# Patient Record
Sex: MALE | Race: OTHER | Employment: UNEMPLOYED | URBAN - METROPOLITAN AREA
[De-identification: names, ages, dates, MRNs, and addresses within clinical notes are randomized per-mention and may not be internally consistent; named-entity substitution may affect disease eponyms.]

---

## 2020-07-17 ENCOUNTER — ANESTHESIA EVENT (OUTPATIENT)
Dept: OPERATING ROOM | Age: 60
DRG: 857 | End: 2020-07-17

## 2020-07-17 ENCOUNTER — ANESTHESIA (OUTPATIENT)
Dept: OPERATING ROOM | Age: 60
DRG: 857 | End: 2020-07-17

## 2020-07-17 ENCOUNTER — HOSPITAL ENCOUNTER (INPATIENT)
Age: 60
LOS: 2 days | Discharge: HOME | DRG: 857 | End: 2020-07-19
Attending: EMERGENCY MEDICINE | Admitting: INTERNAL MEDICINE

## 2020-07-17 ENCOUNTER — APPOINTMENT (OUTPATIENT)
Dept: CT IMAGING | Age: 60
DRG: 857 | End: 2020-07-17

## 2020-07-17 VITALS
SYSTOLIC BLOOD PRESSURE: 156 MMHG | RESPIRATION RATE: 21 BRPM | TEMPERATURE: 98.1 F | OXYGEN SATURATION: 100 % | DIASTOLIC BLOOD PRESSURE: 96 MMHG

## 2020-07-17 PROBLEM — L02.211 ABSCESS OF ABDOMINAL WALL: Status: ACTIVE | Noted: 2020-07-17

## 2020-07-17 LAB
A/G RATIO: 1.1 (ref 1.1–2.2)
ALBUMIN SERPL-MCNC: 3.9 G/DL (ref 3.4–5)
ALP BLD-CCNC: 52 U/L (ref 40–129)
ALT SERPL-CCNC: 27 U/L (ref 10–40)
ANION GAP SERPL CALCULATED.3IONS-SCNC: 9 MMOL/L (ref 3–16)
AST SERPL-CCNC: 30 U/L (ref 15–37)
BASOPHILS ABSOLUTE: 0 K/UL (ref 0–0.2)
BASOPHILS RELATIVE PERCENT: 0.6 %
BILIRUB SERPL-MCNC: 0.8 MG/DL (ref 0–1)
BILIRUBIN URINE: NEGATIVE
BLOOD, URINE: NEGATIVE
BUN BLDV-MCNC: 8 MG/DL (ref 7–20)
CALCIUM SERPL-MCNC: 9.2 MG/DL (ref 8.3–10.6)
CHLORIDE BLD-SCNC: 100 MMOL/L (ref 99–110)
CLARITY: CLEAR
CO2: 25 MMOL/L (ref 21–32)
COLOR: ABNORMAL
CREAT SERPL-MCNC: 0.7 MG/DL (ref 0.9–1.3)
EOSINOPHILS ABSOLUTE: 0.1 K/UL (ref 0–0.6)
EOSINOPHILS RELATIVE PERCENT: 1.7 %
GFR AFRICAN AMERICAN: >60
GFR NON-AFRICAN AMERICAN: >60
GLOBULIN: 3.7 G/DL
GLUCOSE BLD-MCNC: 98 MG/DL (ref 70–99)
GLUCOSE URINE: NEGATIVE MG/DL
HCT VFR BLD CALC: 45.4 % (ref 40.5–52.5)
HEMOGLOBIN: 15.2 G/DL (ref 13.5–17.5)
INR BLD: 1.25 (ref 0.86–1.14)
KETONES, URINE: ABNORMAL MG/DL
LACTIC ACID, SEPSIS: 1.2 MMOL/L (ref 0.4–1.9)
LEUKOCYTE ESTERASE, URINE: NEGATIVE
LIPASE: 29 U/L (ref 13–60)
LYMPHOCYTES ABSOLUTE: 1.6 K/UL (ref 1–5.1)
LYMPHOCYTES RELATIVE PERCENT: 20.7 %
MCH RBC QN AUTO: 30 PG (ref 26–34)
MCHC RBC AUTO-ENTMCNC: 33.4 G/DL (ref 31–36)
MCV RBC AUTO: 89.9 FL (ref 80–100)
MICROSCOPIC EXAMINATION: ABNORMAL
MONOCYTES ABSOLUTE: 0.7 K/UL (ref 0–1.3)
MONOCYTES RELATIVE PERCENT: 9 %
NEUTROPHILS ABSOLUTE: 5.2 K/UL (ref 1.7–7.7)
NEUTROPHILS RELATIVE PERCENT: 68 %
NITRITE, URINE: NEGATIVE
PDW BLD-RTO: 12.8 % (ref 12.4–15.4)
PH UA: 5.5 (ref 5–8)
PLATELET # BLD: 249 K/UL (ref 135–450)
PMV BLD AUTO: 8.7 FL (ref 5–10.5)
POTASSIUM REFLEX MAGNESIUM: 4.3 MMOL/L (ref 3.5–5.1)
PROTEIN UA: NEGATIVE MG/DL
PROTHROMBIN TIME: 14.5 SEC (ref 10–13.2)
RBC # BLD: 5.04 M/UL (ref 4.2–5.9)
SODIUM BLD-SCNC: 134 MMOL/L (ref 136–145)
SPECIFIC GRAVITY UA: 1.02 (ref 1–1.03)
TOTAL PROTEIN: 7.6 G/DL (ref 6.4–8.2)
URINE REFLEX TO CULTURE: ABNORMAL
URINE TYPE: ABNORMAL
UROBILINOGEN, URINE: 1 E.U./DL
WBC # BLD: 7.6 K/UL (ref 4–11)

## 2020-07-17 PROCEDURE — 81003 URINALYSIS AUTO W/O SCOPE: CPT

## 2020-07-17 PROCEDURE — 85610 PROTHROMBIN TIME: CPT

## 2020-07-17 PROCEDURE — APPNB30 APP NON BILLABLE TIME 0-30 MINS: Performed by: NURSE PRACTITIONER

## 2020-07-17 PROCEDURE — 6360000002 HC RX W HCPCS: Performed by: INTERNAL MEDICINE

## 2020-07-17 PROCEDURE — 2500000003 HC RX 250 WO HCPCS: Performed by: NURSE ANESTHETIST, CERTIFIED REGISTERED

## 2020-07-17 PROCEDURE — 87077 CULTURE AEROBIC IDENTIFY: CPT

## 2020-07-17 PROCEDURE — 96367 TX/PROPH/DG ADDL SEQ IV INF: CPT

## 2020-07-17 PROCEDURE — 6360000002 HC RX W HCPCS: Performed by: SURGERY

## 2020-07-17 PROCEDURE — 83605 ASSAY OF LACTIC ACID: CPT

## 2020-07-17 PROCEDURE — 36415 COLL VENOUS BLD VENIPUNCTURE: CPT

## 2020-07-17 PROCEDURE — 2580000003 HC RX 258: Performed by: INTERNAL MEDICINE

## 2020-07-17 PROCEDURE — 96366 THER/PROPH/DIAG IV INF ADDON: CPT

## 2020-07-17 PROCEDURE — 3600000012 HC SURGERY LEVEL 2 ADDTL 15MIN: Performed by: SURGERY

## 2020-07-17 PROCEDURE — 2500000003 HC RX 250 WO HCPCS: Performed by: INTERNAL MEDICINE

## 2020-07-17 PROCEDURE — 3600000002 HC SURGERY LEVEL 2 BASE: Performed by: SURGERY

## 2020-07-17 PROCEDURE — 96365 THER/PROPH/DIAG IV INF INIT: CPT

## 2020-07-17 PROCEDURE — 6360000002 HC RX W HCPCS: Performed by: ANESTHESIOLOGY

## 2020-07-17 PROCEDURE — 87205 SMEAR GRAM STAIN: CPT

## 2020-07-17 PROCEDURE — 7100000000 HC PACU RECOVERY - FIRST 15 MIN: Performed by: SURGERY

## 2020-07-17 PROCEDURE — 85025 COMPLETE CBC W/AUTO DIFF WBC: CPT

## 2020-07-17 PROCEDURE — 2580000003 HC RX 258: Performed by: SURGERY

## 2020-07-17 PROCEDURE — 0KBK0ZZ EXCISION OF RIGHT ABDOMEN MUSCLE, OPEN APPROACH: ICD-10-PCS | Performed by: SURGERY

## 2020-07-17 PROCEDURE — 99254 IP/OBS CNSLTJ NEW/EST MOD 60: CPT | Performed by: SURGERY

## 2020-07-17 PROCEDURE — 6360000002 HC RX W HCPCS: Performed by: PHYSICIAN ASSISTANT

## 2020-07-17 PROCEDURE — 2580000003 HC RX 258: Performed by: NURSE ANESTHETIST, CERTIFIED REGISTERED

## 2020-07-17 PROCEDURE — 1200000000 HC SEMI PRIVATE

## 2020-07-17 PROCEDURE — 11046 DBRDMT MUSC&/FSCA EA ADDL: CPT | Performed by: SURGERY

## 2020-07-17 PROCEDURE — 7100000001 HC PACU RECOVERY - ADDTL 15 MIN: Performed by: SURGERY

## 2020-07-17 PROCEDURE — 6360000002 HC RX W HCPCS: Performed by: NURSE ANESTHETIST, CERTIFIED REGISTERED

## 2020-07-17 PROCEDURE — 3700000001 HC ADD 15 MINUTES (ANESTHESIA): Performed by: SURGERY

## 2020-07-17 PROCEDURE — 87102 FUNGUS ISOLATION CULTURE: CPT

## 2020-07-17 PROCEDURE — 96375 TX/PRO/DX INJ NEW DRUG ADDON: CPT

## 2020-07-17 PROCEDURE — 74177 CT ABD & PELVIS W/CONTRAST: CPT

## 2020-07-17 PROCEDURE — 99285 EMERGENCY DEPT VISIT HI MDM: CPT

## 2020-07-17 PROCEDURE — 83690 ASSAY OF LIPASE: CPT

## 2020-07-17 PROCEDURE — 6360000004 HC RX CONTRAST MEDICATION: Performed by: EMERGENCY MEDICINE

## 2020-07-17 PROCEDURE — 11043 DBRDMT MUSC&/FSCA 1ST 20/<: CPT | Performed by: SURGERY

## 2020-07-17 PROCEDURE — 87186 SC STD MICRODIL/AGAR DIL: CPT

## 2020-07-17 PROCEDURE — 87040 BLOOD CULTURE FOR BACTERIA: CPT

## 2020-07-17 PROCEDURE — 3700000000 HC ANESTHESIA ATTENDED CARE: Performed by: SURGERY

## 2020-07-17 PROCEDURE — 87075 CULTR BACTERIA EXCEPT BLOOD: CPT

## 2020-07-17 PROCEDURE — 2709999900 HC NON-CHARGEABLE SUPPLY: Performed by: SURGERY

## 2020-07-17 PROCEDURE — 87070 CULTURE OTHR SPECIMN AEROBIC: CPT

## 2020-07-17 PROCEDURE — 2580000003 HC RX 258: Performed by: PHYSICIAN ASSISTANT

## 2020-07-17 PROCEDURE — 80053 COMPREHEN METABOLIC PANEL: CPT

## 2020-07-17 RX ORDER — 0.9 % SODIUM CHLORIDE 0.9 %
1000 INTRAVENOUS SOLUTION INTRAVENOUS ONCE
Status: COMPLETED | OUTPATIENT
Start: 2020-07-17 | End: 2020-07-17

## 2020-07-17 RX ORDER — ONDANSETRON 2 MG/ML
4 INJECTION INTRAMUSCULAR; INTRAVENOUS
Status: DISCONTINUED | OUTPATIENT
Start: 2020-07-17 | End: 2020-07-17

## 2020-07-17 RX ORDER — ACETAMINOPHEN 650 MG/1
650 SUPPOSITORY RECTAL EVERY 6 HOURS PRN
Status: DISCONTINUED | OUTPATIENT
Start: 2020-07-17 | End: 2020-07-19 | Stop reason: HOSPADM

## 2020-07-17 RX ORDER — LABETALOL HYDROCHLORIDE 5 MG/ML
5 INJECTION, SOLUTION INTRAVENOUS EVERY 10 MIN PRN
Status: DISCONTINUED | OUTPATIENT
Start: 2020-07-17 | End: 2020-07-17

## 2020-07-17 RX ORDER — LIDOCAINE HYDROCHLORIDE 10 MG/ML
1 INJECTION, SOLUTION EPIDURAL; INFILTRATION; INTRACAUDAL; PERINEURAL
Status: DISCONTINUED | OUTPATIENT
Start: 2020-07-17 | End: 2020-07-17 | Stop reason: HOSPADM

## 2020-07-17 RX ORDER — SODIUM CHLORIDE 9 MG/ML
INJECTION, SOLUTION INTRAVENOUS CONTINUOUS
Status: DISCONTINUED | OUTPATIENT
Start: 2020-07-17 | End: 2020-07-18

## 2020-07-17 RX ORDER — SODIUM CHLORIDE 0.9 % (FLUSH) 0.9 %
10 SYRINGE (ML) INJECTION EVERY 12 HOURS SCHEDULED
Status: DISCONTINUED | OUTPATIENT
Start: 2020-07-17 | End: 2020-07-17 | Stop reason: HOSPADM

## 2020-07-17 RX ORDER — SODIUM CHLORIDE 0.9 % (FLUSH) 0.9 %
10 SYRINGE (ML) INJECTION PRN
Status: DISCONTINUED | OUTPATIENT
Start: 2020-07-17 | End: 2020-07-17 | Stop reason: HOSPADM

## 2020-07-17 RX ORDER — FENTANYL CITRATE 50 UG/ML
INJECTION, SOLUTION INTRAMUSCULAR; INTRAVENOUS PRN
Status: DISCONTINUED | OUTPATIENT
Start: 2020-07-17 | End: 2020-07-17 | Stop reason: SDUPTHER

## 2020-07-17 RX ORDER — ONDANSETRON 2 MG/ML
4 INJECTION INTRAMUSCULAR; INTRAVENOUS ONCE
Status: COMPLETED | OUTPATIENT
Start: 2020-07-17 | End: 2020-07-17

## 2020-07-17 RX ORDER — ROCURONIUM BROMIDE 10 MG/ML
INJECTION, SOLUTION INTRAVENOUS PRN
Status: DISCONTINUED | OUTPATIENT
Start: 2020-07-17 | End: 2020-07-17 | Stop reason: SDUPTHER

## 2020-07-17 RX ORDER — PROPOFOL 10 MG/ML
INJECTION, EMULSION INTRAVENOUS PRN
Status: DISCONTINUED | OUTPATIENT
Start: 2020-07-17 | End: 2020-07-17 | Stop reason: SDUPTHER

## 2020-07-17 RX ORDER — FENTANYL CITRATE 50 UG/ML
25 INJECTION, SOLUTION INTRAMUSCULAR; INTRAVENOUS EVERY 5 MIN PRN
Status: DISCONTINUED | OUTPATIENT
Start: 2020-07-17 | End: 2020-07-17

## 2020-07-17 RX ORDER — HYDRALAZINE HYDROCHLORIDE 20 MG/ML
5 INJECTION INTRAMUSCULAR; INTRAVENOUS EVERY 10 MIN PRN
Status: DISCONTINUED | OUTPATIENT
Start: 2020-07-17 | End: 2020-07-17

## 2020-07-17 RX ORDER — SODIUM CHLORIDE 0.9 % (FLUSH) 0.9 %
10 SYRINGE (ML) INJECTION EVERY 12 HOURS SCHEDULED
Status: DISCONTINUED | OUTPATIENT
Start: 2020-07-17 | End: 2020-07-19 | Stop reason: HOSPADM

## 2020-07-17 RX ORDER — ACETAMINOPHEN 325 MG/1
650 TABLET ORAL EVERY 6 HOURS PRN
Status: DISCONTINUED | OUTPATIENT
Start: 2020-07-17 | End: 2020-07-19 | Stop reason: HOSPADM

## 2020-07-17 RX ORDER — ACETAMINOPHEN 500 MG
1000 TABLET ORAL EVERY 6 HOURS PRN
Status: DISCONTINUED | OUTPATIENT
Start: 2020-07-17 | End: 2020-07-17 | Stop reason: HOSPADM

## 2020-07-17 RX ORDER — SODIUM CHLORIDE, SODIUM LACTATE, POTASSIUM CHLORIDE, CALCIUM CHLORIDE 600; 310; 30; 20 MG/100ML; MG/100ML; MG/100ML; MG/100ML
INJECTION, SOLUTION INTRAVENOUS CONTINUOUS
Status: DISCONTINUED | OUTPATIENT
Start: 2020-07-17 | End: 2020-07-18

## 2020-07-17 RX ORDER — SODIUM CHLORIDE 0.9 % (FLUSH) 0.9 %
10 SYRINGE (ML) INJECTION PRN
Status: DISCONTINUED | OUTPATIENT
Start: 2020-07-17 | End: 2020-07-19 | Stop reason: HOSPADM

## 2020-07-17 RX ORDER — ONDANSETRON 2 MG/ML
INJECTION INTRAMUSCULAR; INTRAVENOUS PRN
Status: DISCONTINUED | OUTPATIENT
Start: 2020-07-17 | End: 2020-07-17 | Stop reason: SDUPTHER

## 2020-07-17 RX ORDER — HYDROCODONE BITARTRATE AND ACETAMINOPHEN 5; 325 MG/1; MG/1
1 TABLET ORAL
Status: DISCONTINUED | OUTPATIENT
Start: 2020-07-17 | End: 2020-07-17

## 2020-07-17 RX ORDER — OXYCODONE HYDROCHLORIDE AND ACETAMINOPHEN 5; 325 MG/1; MG/1
1 TABLET ORAL EVERY 6 HOURS PRN
Status: DISCONTINUED | OUTPATIENT
Start: 2020-07-17 | End: 2020-07-19 | Stop reason: HOSPADM

## 2020-07-17 RX ORDER — MORPHINE SULFATE 2 MG/ML
4 INJECTION, SOLUTION INTRAMUSCULAR; INTRAVENOUS
Status: DISCONTINUED | OUTPATIENT
Start: 2020-07-17 | End: 2020-07-18

## 2020-07-17 RX ORDER — SODIUM CHLORIDE 9 MG/ML
INJECTION, SOLUTION INTRAVENOUS CONTINUOUS PRN
Status: DISCONTINUED | OUTPATIENT
Start: 2020-07-17 | End: 2020-07-17 | Stop reason: SDUPTHER

## 2020-07-17 RX ORDER — SUCCINYLCHOLINE/SOD CL,ISO/PF 200MG/10ML
SYRINGE (ML) INTRAVENOUS PRN
Status: DISCONTINUED | OUTPATIENT
Start: 2020-07-17 | End: 2020-07-17 | Stop reason: SDUPTHER

## 2020-07-17 RX ORDER — MORPHINE SULFATE 4 MG/ML
4 INJECTION, SOLUTION INTRAMUSCULAR; INTRAVENOUS ONCE
Status: COMPLETED | OUTPATIENT
Start: 2020-07-17 | End: 2020-07-17

## 2020-07-17 RX ORDER — DEXAMETHASONE SODIUM PHOSPHATE 4 MG/ML
INJECTION, SOLUTION INTRA-ARTICULAR; INTRALESIONAL; INTRAMUSCULAR; INTRAVENOUS; SOFT TISSUE PRN
Status: DISCONTINUED | OUTPATIENT
Start: 2020-07-17 | End: 2020-07-17 | Stop reason: SDUPTHER

## 2020-07-17 RX ORDER — MORPHINE SULFATE 2 MG/ML
2 INJECTION, SOLUTION INTRAMUSCULAR; INTRAVENOUS
Status: DISCONTINUED | OUTPATIENT
Start: 2020-07-17 | End: 2020-07-18

## 2020-07-17 RX ORDER — MAGNESIUM HYDROXIDE 1200 MG/15ML
LIQUID ORAL CONTINUOUS PRN
Status: COMPLETED | OUTPATIENT
Start: 2020-07-17 | End: 2020-07-17

## 2020-07-17 RX ORDER — LIDOCAINE HYDROCHLORIDE 20 MG/ML
INJECTION, SOLUTION EPIDURAL; INFILTRATION; INTRACAUDAL; PERINEURAL PRN
Status: DISCONTINUED | OUTPATIENT
Start: 2020-07-17 | End: 2020-07-17 | Stop reason: SDUPTHER

## 2020-07-17 RX ORDER — HYDROMORPHONE HCL 110MG/55ML
0.5 PATIENT CONTROLLED ANALGESIA SYRINGE INTRAVENOUS EVERY 5 MIN PRN
Status: DISCONTINUED | OUTPATIENT
Start: 2020-07-17 | End: 2020-07-17

## 2020-07-17 RX ORDER — PROCHLORPERAZINE EDISYLATE 5 MG/ML
5 INJECTION INTRAMUSCULAR; INTRAVENOUS
Status: DISCONTINUED | OUTPATIENT
Start: 2020-07-17 | End: 2020-07-17

## 2020-07-17 RX ORDER — ACETAMINOPHEN 650 MG
TABLET, EXTENDED RELEASE ORAL
Status: COMPLETED | OUTPATIENT
Start: 2020-07-17 | End: 2020-07-17

## 2020-07-17 RX ADMIN — VANCOMYCIN HYDROCHLORIDE 1750 MG: 10 INJECTION, POWDER, LYOPHILIZED, FOR SOLUTION INTRAVENOUS at 21:12

## 2020-07-17 RX ADMIN — MORPHINE SULFATE 4 MG: 4 INJECTION INTRAVENOUS at 08:38

## 2020-07-17 RX ADMIN — CEFEPIME HYDROCHLORIDE 2 G: 2 INJECTION, POWDER, FOR SOLUTION INTRAVENOUS at 15:35

## 2020-07-17 RX ADMIN — SUGAMMADEX 200 MG: 100 INJECTION, SOLUTION INTRAVENOUS at 17:32

## 2020-07-17 RX ADMIN — FENTANYL CITRATE 50 MCG: 50 INJECTION, SOLUTION INTRAMUSCULAR; INTRAVENOUS at 16:52

## 2020-07-17 RX ADMIN — METRONIDAZOLE 500 MG: 500 INJECTION, SOLUTION INTRAVENOUS at 15:13

## 2020-07-17 RX ADMIN — ONDANSETRON 4 MG: 2 INJECTION INTRAMUSCULAR; INTRAVENOUS at 17:09

## 2020-07-17 RX ADMIN — CEFEPIME 2 G: 2 INJECTION, POWDER, FOR SOLUTION INTRAVENOUS at 08:37

## 2020-07-17 RX ADMIN — HYDROMORPHONE HYDROCHLORIDE 0.5 MG: 2 INJECTION, SOLUTION INTRAMUSCULAR; INTRAVENOUS; SUBCUTANEOUS at 18:03

## 2020-07-17 RX ADMIN — MORPHINE SULFATE 4 MG: 2 INJECTION, SOLUTION INTRAMUSCULAR; INTRAVENOUS at 19:25

## 2020-07-17 RX ADMIN — IOPAMIDOL 75 ML: 755 INJECTION, SOLUTION INTRAVENOUS at 09:43

## 2020-07-17 RX ADMIN — HYDROMORPHONE HYDROCHLORIDE 0.5 MG: 2 INJECTION, SOLUTION INTRAMUSCULAR; INTRAVENOUS; SUBCUTANEOUS at 17:53

## 2020-07-17 RX ADMIN — VANCOMYCIN HYDROCHLORIDE 1500 MG: 10 INJECTION, POWDER, LYOPHILIZED, FOR SOLUTION INTRAVENOUS at 09:23

## 2020-07-17 RX ADMIN — FENTANYL CITRATE 50 MCG: 50 INJECTION, SOLUTION INTRAMUSCULAR; INTRAVENOUS at 17:16

## 2020-07-17 RX ADMIN — SODIUM CHLORIDE: 9 INJECTION, SOLUTION INTRAVENOUS at 16:45

## 2020-07-17 RX ADMIN — DEXAMETHASONE SODIUM PHOSPHATE 8 MG: 4 INJECTION, SOLUTION INTRAMUSCULAR; INTRAVENOUS at 17:09

## 2020-07-17 RX ADMIN — LIDOCAINE HYDROCHLORIDE 80 MG: 20 INJECTION, SOLUTION EPIDURAL; INFILTRATION; INTRACAUDAL; PERINEURAL at 16:58

## 2020-07-17 RX ADMIN — SODIUM CHLORIDE 1000 ML: 9 INJECTION, SOLUTION INTRAVENOUS at 08:37

## 2020-07-17 RX ADMIN — ONDANSETRON 4 MG: 2 INJECTION INTRAMUSCULAR; INTRAVENOUS at 08:38

## 2020-07-17 RX ADMIN — ROCURONIUM BROMIDE 25 MG: 10 INJECTION, SOLUTION INTRAVENOUS at 17:17

## 2020-07-17 RX ADMIN — Medication 120 MG: at 16:58

## 2020-07-17 RX ADMIN — PROPOFOL 150 MG: 10 INJECTION, EMULSION INTRAVENOUS at 16:58

## 2020-07-17 RX ADMIN — SODIUM CHLORIDE: 9 INJECTION, SOLUTION INTRAVENOUS at 16:57

## 2020-07-17 ASSESSMENT — PULMONARY FUNCTION TESTS
PIF_VALUE: 23
PIF_VALUE: 17
PIF_VALUE: 20
PIF_VALUE: 3
PIF_VALUE: 20
PIF_VALUE: 0
PIF_VALUE: 41
PIF_VALUE: 18
PIF_VALUE: 20
PIF_VALUE: 20
PIF_VALUE: 18
PIF_VALUE: 23
PIF_VALUE: 21
PIF_VALUE: 20
PIF_VALUE: 17
PIF_VALUE: 20
PIF_VALUE: 18
PIF_VALUE: 20
PIF_VALUE: 21
PIF_VALUE: 18
PIF_VALUE: 2
PIF_VALUE: 3
PIF_VALUE: 20
PIF_VALUE: 1
PIF_VALUE: 24
PIF_VALUE: 31
PIF_VALUE: 19
PIF_VALUE: 21
PIF_VALUE: 20
PIF_VALUE: 19
PIF_VALUE: 44
PIF_VALUE: 1
PIF_VALUE: 1
PIF_VALUE: 20
PIF_VALUE: 27
PIF_VALUE: 18
PIF_VALUE: 1
PIF_VALUE: 17
PIF_VALUE: 17
PIF_VALUE: 21
PIF_VALUE: 20
PIF_VALUE: 18
PIF_VALUE: 20
PIF_VALUE: 20
PIF_VALUE: 1
PIF_VALUE: 21
PIF_VALUE: 1
PIF_VALUE: 20
PIF_VALUE: 22

## 2020-07-17 ASSESSMENT — PAIN SCALES - GENERAL
PAINLEVEL_OUTOF10: 5
PAINLEVEL_OUTOF10: 9
PAINLEVEL_OUTOF10: 7
PAINLEVEL_OUTOF10: 9
PAINLEVEL_OUTOF10: 0
PAINLEVEL_OUTOF10: 0
PAINLEVEL_OUTOF10: 3
PAINLEVEL_OUTOF10: 6

## 2020-07-17 ASSESSMENT — ENCOUNTER SYMPTOMS
COUGH: 0
ABDOMINAL PAIN: 1
COLOR CHANGE: 0
VOMITING: 0
ABDOMINAL DISTENTION: 0
BACK PAIN: 0
CONSTIPATION: 0
CHEST TIGHTNESS: 0
SHORTNESS OF BREATH: 0
RESPIRATORY NEGATIVE: 1
DIARRHEA: 1
SHORTNESS OF BREATH: 0
NAUSEA: 1

## 2020-07-17 ASSESSMENT — PAIN DESCRIPTION - LOCATION
LOCATION: ABDOMEN

## 2020-07-17 ASSESSMENT — PAIN DESCRIPTION - PAIN TYPE
TYPE: SURGICAL PAIN
TYPE: ACUTE PAIN

## 2020-07-17 ASSESSMENT — PAIN - FUNCTIONAL ASSESSMENT: PAIN_FUNCTIONAL_ASSESSMENT: 0-10

## 2020-07-17 NOTE — BRIEF OP NOTE
Brief Postoperative Note      Patient: Pete Haynes  YOB: 1960  MRN: [id number]    Date of Procedure: 7/17/2020    Pre-Op Diagnosis: Abdominal Wall Abscess    Post-Op Diagnosis: Same       Procedure(s):  INCISION AND DRAINAGE  / Debridement OF ABDOMINAL WALL ABSCESS    Surgeon(s):  Bakari Richards MD    Assistant:  First Assistant: Dwayne Ceron, RN; Malcom Jarvis RN    Anesthesia: General    Estimated Blood Loss (mL): Minimal    Complications: None    Specimens:   ID Type Source Tests Collected by Time Destination   1 : 1) ABDOMINAL ABSCESS CULTURE Tissue Tissue CULTURE, TISSUE Bakari Richards MD 7/17/2020 1717        Implants:  * No implants in log *      Drains: * No LDAs found *    Findings: Large deep necrotizing abscess; Skin, SQ, and muscle    Electronically signed by Bakari Richards MD on 7/17/2020 at 5:38 PM

## 2020-07-17 NOTE — PROGRESS NOTES
Pt arrived from OR to PACU, awakens to voice. VSS, O2 sats 93% on room air. Dressing on abdomen dry and intact, ice pack placed. Will monitor.

## 2020-07-17 NOTE — CONSULTS
injection 5 mg, 5 mg, Intravenous, Q10 Min PRN  Prior to Admission medications    Not on File        Allergies:  Patient has no known allergies. Social History:    reports that he has never smoked. He has never used smokeless tobacco. He reports that he does not drink alcohol or use drugs. Family History:    History reviewed. No pertinent family history. REVIEW OF SYSTEMS:  CONSTITUTIONAL:  negative  HEENT:  negative  RESPIRATORY:  negative  CARDIOVASCULAR:  negative  GASTROINTESTINAL:  negative except for change in bowel habits, abdominal pain and abdominal mass  GENITOURINARY:  negative  HEMATOLOGIC/LYMPHATIC:  negative  NEUROLOGICAL:  negative  SKIN: negative    PHYSICAL EXAM:  VITALS:  /80   Pulse 69   Temp 98.6 °F (37 °C) (Temporal)   Resp 16   Ht 5' 5\" (1.651 m)   Wt 213 lb (96.6 kg)   SpO2 98%   BMI 35.45 kg/m²   24HR INTAKE/OUTPUT:      Intake/Output Summary (Last 24 hours) at 7/17/2020 1640  Last data filed at 7/17/2020 1135  Gross per 24 hour   Intake 1050 ml   Output --   Net 1050 ml     DRAIN/TUBE OUTPUT:     CONSTITUTIONAL:  alert, no apparent distress and moderately obese  EYES:  sclera clear  ENT:  normocepalic, without obvious abnormality  NECK:  supple, symmetrical, trachea midline  LUNGS:  clear to auscultation  CARDIOVASCULAR:  regular rate and rhythm  ABDOMEN:  scars noted large midline scar and lower abd scar, normal bowel sounds, soft, non-distended, tenderness noted in the lower abdomen, voluntary guarding absent, no masses palpated, no hepatosplenomegally and hernia absent  MUSCULOSKELETAL:  0+ pitting edema lower extremities  NEUROLOGIC:  Mental Status Exam:  Level of Alertness:   awake  Orientation:   person, place, time  SKIN: low abd incision / old scar region with redness, warmth, and swelling.  Has denuded skin with drainage    DATA:    CBC:   Recent Labs     07/17/20  0830   WBC 7.6   HGB 15.2   HCT 45.4        BMP:    Recent Labs     07/17/20  0830   NA 134*   K 4.3      CO2 25   BUN 8   CREATININE 0.7*   GLUCOSE 98     Hepatic:   Recent Labs     07/17/20  0830   AST 30   ALT 27   BILITOT 0.8   ALKPHOS 52     Mag:    No results for input(s): MG in the last 72 hours. Phos:   No results for input(s): PHOS in the last 72 hours. INR:   Recent Labs     07/17/20  0830   INR 1.25*     LIPASE:   Recent Labs     07/17/20  0830   LIPASE 29.0      AMYLASE: No results for input(s): AMYLASE in the last 72 hours. Radiology Review:       Ct Abdomen Pelvis W Iv Contrast Additional Contrast? None    Result Date: 7/17/2020  EXAMINATION: CT OF THE ABDOMEN AND PELVIS WITH CONTRAST 7/17/2020 9:43 am TECHNIQUE: CT of the abdomen and pelvis was performed with the administration of intravenous contrast. Multiplanar reformatted images are provided for review. Dose modulation, iterative reconstruction, and/or weight based adjustment of the mA/kV was utilized to reduce the radiation dose to as low as reasonably achievable. COMPARISON: None. HISTORY: ORDERING SYSTEM PROVIDED HISTORY: abscess - abd - midline - prev surgical scar TECHNOLOGIST PROVIDED HISTORY: Additional Contrast?->None Reason for exam:->abscess - abd - midline - prev surgical scar Reason for Exam: abscess - abd - midline - prev surgical scar Acuity: Unknown Type of Exam: Unknown FINDINGS: Lower Chest: There is no consolidation or effusion. Organs: The liver exhibits mild diffuse fatty infiltration. The remainder of the solid abdominal organs are unremarkable aside from a small benign 3.3 cm simple left renal cyst. GI/Bowel: There is no bowel dilatation, wall thickening or obstruction. Pelvis: There are small fat and colon containing bilateral indirect inguinal hernias without complicating feature. The bladder and prostate are unremarkable. Peritoneum/Retroperitoneum: There is no free air, free fluid or intraperitoneal inflammatory change. There is no adenopathy. Bones/Soft Tissues:  There is a thick walled rim

## 2020-07-17 NOTE — ANESTHESIA PRE PROCEDURE
Department of Anesthesiology  Preprocedure Note       Name:  Pete Haynes   Age:  62 y.o.  :  1960                                          MRN:  [id number]         Date:  2020      Surgeon: Yasmany Macdonald):  Bakari Richards MD    Procedure: Procedure(s):  INCISION AND DRAINAGE OF ABDOMINAL WALL ABSCESS    Medications prior to admission:   Prior to Admission medications    Not on File       Current medications:    No current facility-administered medications for this encounter. Allergies:  No Known Allergies    Problem List:    Patient Active Problem List   Diagnosis Code    Abdominal wall abscess L02.211       Past Medical History:  History reviewed. No pertinent past medical history. Past Surgical History:        Procedure Laterality Date    OTHER SURGICAL HISTORY      ORIF RIGHT FOREARM       Social History:    Social History     Tobacco Use    Smoking status: Never Smoker    Smokeless tobacco: Never Used   Substance Use Topics    Alcohol use: No                                Counseling given: Not Answered      Vital Signs (Current):   Vitals:    20 1200 20 1230 20 1320 20 1358   BP: 122/77 116/72 114/74 124/80   Pulse:   65 69   Resp:   18 16   Temp:    98.6 °F (37 °C)   TempSrc:    Temporal   SpO2: 97%  96% 98%   Weight:    213 lb (96.6 kg)   Height:    5' 5\" (1.651 m)                                              BP Readings from Last 3 Encounters:   20 124/80       NPO Status:                                                                                 BMI:   Wt Readings from Last 3 Encounters:   20 213 lb (96.6 kg)     Body mass index is 35.45 kg/m².     CBC:   Lab Results   Component Value Date    WBC 7.6 2020    RBC 5.04 2020    HGB 15.2 2020    HCT 45.4 2020    MCV 89.9 2020    RDW 12.8 2020     2020       CMP:   Lab Results   Component Value Date     2020    K 4.3 2020  07/17/2020    CO2 25 07/17/2020    BUN 8 07/17/2020    CREATININE 0.7 07/17/2020    GFRAA >60 07/17/2020    AGRATIO 1.1 07/17/2020    LABGLOM >60 07/17/2020    GLUCOSE 98 07/17/2020    PROT 7.6 07/17/2020    CALCIUM 9.2 07/17/2020    BILITOT 0.8 07/17/2020    ALKPHOS 52 07/17/2020    AST 30 07/17/2020    ALT 27 07/17/2020       POC Tests: No results for input(s): POCGLU, POCNA, POCK, POCCL, POCBUN, POCHEMO, POCHCT in the last 72 hours. Coags:   Lab Results   Component Value Date    PROTIME 14.5 07/17/2020    INR 1.25 07/17/2020       HCG (If Applicable): No results found for: PREGTESTUR, PREGSERUM, HCG, HCGQUANT     ABGs: No results found for: PHART, PO2ART, MWE0NGX, LZV6SMI, BEART, B6OHRMOB     Type & Screen (If Applicable):  No results found for: LABABO, LABRH    Drug/Infectious Status (If Applicable):  No results found for: HIV, HEPCAB    COVID-19 Screening (If Applicable): No results found for: COVID19      Anesthesia Evaluation  Patient summary reviewed and Nursing notes reviewed no history of anesthetic complications:   Airway: Mallampati: II  TM distance: >3 FB   Neck ROM: full  Mouth opening: > = 3 FB Dental: normal exam   (+) upper dentures and lower dentures      Pulmonary:       (-) asthma and shortness of breath                           Cardiovascular:  Exercise tolerance: good (>4 METS),       (-) hypertension and  angina                Neuro/Psych:      (-) CVA           GI/Hepatic/Renal:        (-) GERD and liver disease       Endo/Other:        (-) diabetes mellitus, hypothyroidism               Abdominal:           Vascular:     - PVD. Anesthesia Plan      general     ASA 2       Induction: intravenous. MIPS: Postoperative opioids intended and Prophylactic antiemetics administered. Anesthetic plan and risks discussed with patient. Use of blood products discussed with patient whom. Plan discussed with CRNA.                   Lonnie Oliver Leny Johnson MD   7/17/2020

## 2020-07-17 NOTE — H&P
input(s): Theldalbin Rough in the last 72 hours. Urinalysis:      Lab Results   Component Value Date    NITRU Negative 07/17/2020    BLOODU Negative 07/17/2020    SPECGRAV 1.022 07/17/2020    GLUCOSEU Negative 07/17/2020       Radiology:     CXR: I have reviewed the CXR with the following interpretation: Not available  EKG:  I have reviewed the EKG with the following interpretation: Not available    CT ABDOMEN PELVIS W IV CONTRAST Additional Contrast? None   Final Result   Large 6.5 cm right ventral pelvic subcutaneous abscess. ASSESSMENT:    Active Hospital Problems    Diagnosis Date Noted    Abscess of abdominal wall [L02.211] 07/17/2020         PLAN:    Subcutaneous abdominal wall abscess  Surgery consulted for I&D  Initiated on broad-spectrum antibiotics  Cultures ordered  We will narrow antibiotic coverage based on cultures      DVT Prophylaxis: Lovenox  Diet: Diet NPO Effective Now Exceptions are:  Other (See Comments)  Code Status: No Order    Electronically signed by Jennie Simon MD on 7/17/2020 at 12:32 PM

## 2020-07-17 NOTE — ED PROVIDER NOTES
I independently performed a history and physical on [place]. All diagnostic, treatment, and disposition decisions were made by myself in conjunction with the advanced practice provider. I have participated in the medical decision making and directed the treatment plan and disposition of the patient. For further details of [facility] emergency department encounter, please see the advanced practice provider's documentation. CHIEF COMPLAINT  Chief Complaint   Patient presents with    Abdominal Pain     Pt to Er with c/o RLQ pain that is moving into left side for two weeeks, states diarrhea since but none today. nausea, no vomiting. \"swelling inside\"     Briefly, GARRICK CONRAD is a 62 y.o. male  who presents to the ED complaining of lower abdominal pain with open wound and purulent drainage. Some nausea. No vomiting. No issues with urination. History of cholecystectomy and ex-lap from trauma in the past.  No testicular pain or scrotal edema. FOCUSED PHYSICAL EXAMINATION  /76   Pulse 64   Temp 98.6 °F (37 °C) (Oral)   Resp 18   Ht 5' 5\" (1.651 m)   Wt 213 lb 3 oz (96.7 kg)   SpO2 96%   BMI 35.48 kg/m²    Focused physical examination notable for no acute distress, well-appearing, well-nourished, normal speech and mentation without obvious facial droop, no obvious rash. No obvious cranial nerve deficits on my initial exam.  Mons pubis is notably indurated with central open wound with purulence, erythema and warmth. No abdominal peritonitis or diffuse tenderness. Normal penis and scrotum. MDM:  Diagnostic considerations included kidney stone, pyelonephritis, UTI, appendicitis, bowel obstruction, diverticulitis, hernia, gastritis/gastroenteritis, pancreatitis, cholecystitis, hepatitis, constipation, IBS, IBD, abscess/cellulitis    ED course was notable for abdominal wall abscess that is quite extensively large in the ventral pelvis area. Surgery was consulted. Broad-spectrum antibiotics initiated. Labs are reassuring and vitals are reassuring however given the extensive nature of infection is unlikely to do well as an outpatient. Wound culture was also obtained. Admit. During the patient's ED course, the patient was given:  Medications   vancomycin (VANCOCIN) 1,500 mg in dextrose 5 % 250 mL IVPB (1,500 mg Intravenous New Bag 7/17/20 0923)   0.9 % sodium chloride bolus (1,000 mLs Intravenous New Bag 7/17/20 0837)   morphine injection 4 mg (4 mg Intravenous Given 7/17/20 0838)   ondansetron (ZOFRAN) injection 4 mg (4 mg Intravenous Given 7/17/20 0838)   cefepime (MAXIPIME) 2 g IVPB minibag (0 g Intravenous Stopped 7/17/20 0944)   iopamidol (ISOVUE-370) 76 % injection 75 mL (75 mLs Intravenous Given 7/17/20 0943)        CLINICAL IMPRESSION  1. Abdominal wall abscess        DISPOSITION  Azeem Geiger was admitted in fair condition. The plan is to admit to the hospital at this time under the hospitalist service. Hospitalist accepted the patient and will take over the patient's care. This chart was created using Dragon dictation software. Efforts were made by me to ensure accuracy, however some errors may be present due to limitations of this technology.             Art Watters MD  07/17/20 [id number]

## 2020-07-17 NOTE — ED PROVIDER NOTES
[facility]        Pt Name: Nicol Cobb  MRN: [id number]  [place] 1960  Date of evaluation: 7/17/2020  Provider: GRISEL Pham  PCP: No primary care provider on file. I have seen and evaluated this patient with my supervising physician Gino Byrnes MD.    [address] [place] [address]       Chief Complaint   Patient presents with    Abdominal Pain     Pt to Er with c/o RLQ pain that is moving into left side for two weeeks, states diarrhea since but none today. nausea, no vomiting. \"swelling inside\"       HISTORY OF PRESENT ILLNESS   (Location, Timing/Onset, Context/Setting, Quality, Duration, Modifying Factors, Severity, Associated Signs and Symptoms)  Note limiting factors. Nicol Cobb is a 62 y.o. male with no significant past medical history who presents to the ED with bit of abdominal pain. Patient states 2-week lower abdominal pain initially started to his right lower quadrant but is now moved to the left side. Patient states he feels that something is \"growing\". Patient states he is had nausea but denies any vomiting. Denies any urinary symptoms. States has had diarrhea. Denies fever chills. Denies injury or trauma. Denies chest pain or shortness of breath. Patient states he had previous gallbladder surgery and states he has a scar to his abdomen. States that the lower aspect of the scar he has an \"wound\". Patient states it is draining purulent material.  He is has been doing that for the past several days. Patient states he is nondiabetic. Denies any significant history of cellulitis or MRSA. States he does have pain to the abscess site that he rates as a 3/10. Patient states pain is sharp in nature and worse with palpation. Nursing Notes were all reviewed and agreed with or any disagreements were addressed in the HPI.     REVIEW OF SYSTEMS    (2-9 systems for level 4, 10 or more for level 5) Review of Systems   Constitutional: Negative for activity change, appetite change, chills and fever. Respiratory: Negative. Negative for cough, chest tightness and shortness of breath. Cardiovascular: Negative. Negative for chest pain. Gastrointestinal: Positive for abdominal pain, diarrhea and nausea. Negative for abdominal distention, constipation and vomiting. Genitourinary: Negative for decreased urine volume, difficulty urinating, dysuria, flank pain, frequency, hematuria and urgency. Musculoskeletal: Negative for arthralgias, back pain, myalgias, neck pain and neck stiffness. Skin: Negative for color change, pallor, rash and wound. Neurological: Negative for dizziness, light-headedness and headaches. Positives and Pertinent negatives as per HPI. Except as noted above in the ROS, all other systems were reviewed and negative. PAST MEDICAL HISTORY   No past medical history on file. SURGICAL HISTORY     Past Surgical History:   Procedure Laterality Date    OTHER SURGICAL HISTORY      ORIF RIGHT FOREARM         CURRENTMEDICATIONS       Previous Medications    No medications on file         ALLERGIES     Patient has no known allergies. FAMILYHISTORY     No family history on file. SOCIAL HISTORY       Social History     Tobacco Use    Smoking status: Never Smoker    Smokeless tobacco: Never Used   Substance Use Topics    Alcohol use: No    Drug use: No       SCREENINGS             PHYSICAL EXAM    (up to 7 for level 4, 8 or more for level 5)     ED Triage Vitals [07/17/20 0750]   BP Temp Temp Source Pulse Resp SpO2 Height Weight   (!) 155/107 98.6 °F (37 °C) Oral 90 18 97 % 5' 5\" (1.651 m) 213 lb 3 oz (96.7 kg)       Physical Exam  Constitutional:       Appearance: He is well-developed. He is not diaphoretic. HENT:      Head: Normocephalic and atraumatic.       Right Ear: External ear normal.      Left Ear: External ear normal.   Eyes:      General:         Right eye: No discharge. Left eye: No discharge. Neck:      Musculoskeletal: Normal range of motion and neck supple. Cardiovascular:      Rate and Rhythm: Normal rate and regular rhythm. Pulses: Normal pulses. Heart sounds: Normal heart sounds. No murmur. No friction rub. No gallop. Pulmonary:      Effort: Pulmonary effort is normal. No respiratory distress. Breath sounds: Normal breath sounds. No stridor. No wheezing, rhonchi or rales. Chest:      Chest wall: No tenderness. Abdominal:      General: Abdomen is flat. Bowel sounds are normal. There is no distension. Palpations: Abdomen is soft. There is no mass. Tenderness: There is abdominal tenderness in the right lower quadrant, suprapubic area and left lower quadrant. There is no right CVA tenderness, left CVA tenderness, guarding or rebound. Negative signs include Bynum's sign, Rovsing's sign and McBurney's sign. Hernia: No hernia is present. Comments: Previous midline incision noted to the abdomen. To the lower aspect of the midline incision appears to show significant induration with areas that is open and actively draining purulent material at this time. Surrounding erythema noted. Induration extends to the suprapubic abdomen and to the bilateral lower quadrants. No extension into the penis or testicles. Tenderness palpation of this area with large amount of purulent material that is able to be expressed with palpation. No crepitus noted. Musculoskeletal: Normal range of motion. Skin:     General: Skin is warm and dry. Coloration: Skin is not pale. Findings: No erythema. Neurological:      Mental Status: He is alert and oriented to person, place, and time.    Psychiatric:         Behavior: Behavior normal.         DIAGNOSTIC RESULTS   LABS:    Labs Reviewed   COMPREHENSIVE METABOLIC PANEL W/ REFLEX TO MG FOR LOW K - Abnormal; Notable for the following components:       Result Value    Sodium 134 (*)     CREATININE 0.7 (*)     All other components within normal limits    Narrative:     Performed at:  [facility] [address]  [place], [place]   Phone [phone]   PROTIME-INR - Abnormal; Notable for the following components:    Protime 14.5 (*)     INR 1.25 (*)     All other components within normal limits    Narrative:     Performed at:  [facility] [address] [place]  [place], [place]   Phone [phone]   URINE RT REFLEX TO CULTURE - Abnormal; Notable for the following components:    Ketones, Urine TRACE (*)     All other components within normal limits    Narrative:     Performed at:  [facility] [address],  [place], [facility] [place]   Phone [phone]   CULTURE, BLOOD 1   CULTURE, BLOOD 2   CULTURE, WOUND    Narrative:     ORDER#: [id number]                          ORDERED BY: Katie Alexander  SOURCE: Abscess                            COLLECTED:  07/17/20 08:30  ANTIBIOTICS AT SUE.:                      RECEIVED :  07/17/20 09:19  Performed at:  [facility]  [address] [place], [place] [address]   Phone [phone]   CBC WITH AUTO DIFFERENTIAL    Narrative:     Performed at:  [facility] [address],  [place], [place]   Phone [phone]   LACTATE, SEPSIS    Narrative:     Performed at:  [facility] [address] [place],  [place], [place]   Phone [phone]   LIPASE    Narrative:     Performed at:  [facility] [address],  [place], [place]   Phone [phone]   LACTATE, SEPSIS       All other labs were within normal range or not returned as of this dictation. EKG:  All EKG's are interpreted by the Emergency Department Physician in the absence of a cardiologist.  Please see their note for interpretation of EKG.      RADIOLOGY:   Non-plain film images such as CT, Ultrasound and MRI are read by the radiologist. Plain radiographic images are visualized and preliminarily interpreted by the ED Provider with the below findings:        Interpretation per the Radiologist below, if available at the time of this note:    CT ABDOMEN PELVIS W IV CONTRAST Additional Contrast? None   Final Result   Large 6.5 cm right ventral pelvic subcutaneous abscess. No results found. PROCEDURES   Unless otherwise noted below, none     Procedures    CRITICAL CARE TIME   N/A    CONSULTS:  IP CONSULT TO GENERAL SURGERY  IP CONSULT TO HOSPITALIST  IP CONSULT TO GENERAL SURGERY  PHARMACY TO DOSE VANCOMYCIN      EMERGENCY DEPARTMENT COURSE and DIFFERENTIAL DIAGNOSIS/MDM:   Vitals:    Vitals:    07/17/20 0750 07/17/20 0930 07/17/20 1030   BP: (!) 155/107 114/79 124/76   Pulse: 90  64   Resp: 18  18   Temp: 98.6 °F (37 °C)     TempSrc: Oral     SpO2: 97%  96%   Weight: 213 lb 3 oz (96.7 kg)     Height: 5' 5\" (1.651 m)         Patient was given the following medications:  Medications   0.9 % sodium chloride bolus (1,000 mLs Intravenous New Bag 7/17/20 0837)   morphine injection 4 mg (4 mg Intravenous Given 7/17/20 0838)   ondansetron (ZOFRAN) injection 4 mg (4 mg Intravenous Given 7/17/20 0838)   cefepime (MAXIPIME) 2 g IVPB minibag (0 g Intravenous Stopped 7/17/20 0944)   vancomycin (VANCOCIN) 1,500 mg in dextrose 5 % 250 mL IVPB (1,500 mg Intravenous New Bag 7/17/20 0923)   iopamidol (ISOVUE-370) 76 % injection 75 mL (75 mLs Intravenous Given 7/17/20 0943)           Patient is a 64-year-old male who presents to the ED with complaint of abdominal pain. Upon examination patient has significant cellulitis and what appears to be abscess to the abdominal wall. Actively draining purulent material at this time. IV established patient started on broad-spectrum biotics with vancomycin and cefepime. Given morphine and Zofran for his pain. Fluids ordered. CBC showed normal white count, hemoglobin and platelets. CMP unremarkable. Lactic acid normal.  Blood cultures x2 obtained and pending at this time. INR 1.25. Lipase normal.  Urinalysis unremarkable. Wound culture obtained given open area actively drain at this time. CT of the abdomen pelvis obtained and showed a 6.5 cm right ventral pelvic subcutaneous abscess noted. Patient does have active drainage at this time but does have significant surrounding cellulitis and believe would benefit from admission for further IV antibiotics. May need further incision and drainage but given the location and size believe would benefit from general surgery consultation. Case discussed with general surgery team who will see patient in consultation. Admit to hospital service for further antibiotics and general surgery consultation given large abdominal wall abscess with significant cellulitis. Low suspicion for necrotizing fasciitis. FINAL IMPRESSION      1. Abdominal wall abscess          DISPOSITION/PLAN   DISPOSITION Admitted 07/17/2020 11:21:54 AM      PATIENT REFERREDTO:  No follow-up provider specified.     DISCHARGE MEDICATIONS:  New Prescriptions    No medications on file       DISCONTINUED MEDICATIONS:  Discontinued Medications    No medications on file              (Please note that portions of this note were completed with a voice recognition program.  Efforts were made to edit the dictations but occasionally words are mis-transcribed.)    GRISEL Song (electronically signed)          GRISEL Varner  07/17/20 1129

## 2020-07-17 NOTE — ANESTHESIA POSTPROCEDURE EVALUATION
Department of Anesthesiology  Postprocedure Note    Patient: Sarah Arenas  MRN: [id number]  YOB: 1960  Date of evaluation: 7/17/2020  Time:  6:18 PM     Procedure Summary     Date:  07/17/20 Room / Location:  [address] [place] [address]    Anesthesia Start:  6268 Anesthesia Stop:  1748    Procedure:  INCISION AND DRAINAGE OF ABDOMINAL WALL ABSCESS (N/A Abdomen) Diagnosis:  (Abdominal Wall Abscess)    Surgeon:  Bautista Moy MD Responsible Provider:  Juan Armas MD    Anesthesia Type:  general ASA Status:  2          Anesthesia Type: general    Maida Phase I: Maida Score: 10    Maida Phase II:      Last vitals: Reviewed and per EMR flowsheets.        Anesthesia Post Evaluation    Patient location during evaluation: PACU  Patient participation: complete - patient participated  Level of consciousness: awake  Airway patency: patent  Nausea & Vomiting: no nausea and no vomiting  Complications: no  Cardiovascular status: hemodynamically stable  Respiratory status: acceptable  Hydration status: stable

## 2020-07-18 LAB
ANION GAP SERPL CALCULATED.3IONS-SCNC: 8 MMOL/L (ref 3–16)
BUN BLDV-MCNC: 9 MG/DL (ref 7–20)
CALCIUM SERPL-MCNC: 9 MG/DL (ref 8.3–10.6)
CHLORIDE BLD-SCNC: 103 MMOL/L (ref 99–110)
CO2: 24 MMOL/L (ref 21–32)
CREAT SERPL-MCNC: 0.7 MG/DL (ref 0.9–1.3)
GFR AFRICAN AMERICAN: >60
GFR NON-AFRICAN AMERICAN: >60
GLUCOSE BLD-MCNC: 166 MG/DL (ref 70–99)
HCT VFR BLD CALC: 42.1 % (ref 40.5–52.5)
HEMOGLOBIN: 14.1 G/DL (ref 13.5–17.5)
MCH RBC QN AUTO: 29.9 PG (ref 26–34)
MCHC RBC AUTO-ENTMCNC: 33.6 G/DL (ref 31–36)
MCV RBC AUTO: 89.2 FL (ref 80–100)
PDW BLD-RTO: 13 % (ref 12.4–15.4)
PLATELET # BLD: 283 K/UL (ref 135–450)
PMV BLD AUTO: 8.9 FL (ref 5–10.5)
POTASSIUM SERPL-SCNC: 4.6 MMOL/L (ref 3.5–5.1)
RBC # BLD: 4.72 M/UL (ref 4.2–5.9)
SODIUM BLD-SCNC: 135 MMOL/L (ref 136–145)
WBC # BLD: 8.5 K/UL (ref 4–11)

## 2020-07-18 PROCEDURE — 80048 BASIC METABOLIC PNL TOTAL CA: CPT

## 2020-07-18 PROCEDURE — 2580000003 HC RX 258: Performed by: SURGERY

## 2020-07-18 PROCEDURE — 1200000000 HC SEMI PRIVATE

## 2020-07-18 PROCEDURE — 6360000002 HC RX W HCPCS: Performed by: SURGERY

## 2020-07-18 PROCEDURE — 36415 COLL VENOUS BLD VENIPUNCTURE: CPT

## 2020-07-18 PROCEDURE — APPNB30 APP NON BILLABLE TIME 0-30 MINS: Performed by: NURSE PRACTITIONER

## 2020-07-18 PROCEDURE — 2500000003 HC RX 250 WO HCPCS: Performed by: SURGERY

## 2020-07-18 PROCEDURE — 85027 COMPLETE CBC AUTOMATED: CPT

## 2020-07-18 PROCEDURE — APPSS15 APP SPLIT SHARED TIME 0-15 MINUTES: Performed by: NURSE PRACTITIONER

## 2020-07-18 PROCEDURE — 99231 SBSQ HOSP IP/OBS SF/LOW 25: CPT | Performed by: SURGERY

## 2020-07-18 RX ORDER — MORPHINE SULFATE 2 MG/ML
2 INJECTION, SOLUTION INTRAMUSCULAR; INTRAVENOUS EVERY 4 HOURS PRN
Status: DISCONTINUED | OUTPATIENT
Start: 2020-07-18 | End: 2020-07-19 | Stop reason: HOSPADM

## 2020-07-18 RX ADMIN — VANCOMYCIN HYDROCHLORIDE 1750 MG: 10 INJECTION, POWDER, LYOPHILIZED, FOR SOLUTION INTRAVENOUS at 19:54

## 2020-07-18 RX ADMIN — CEFEPIME HYDROCHLORIDE 2 G: 2 INJECTION, POWDER, FOR SOLUTION INTRAVENOUS at 00:17

## 2020-07-18 RX ADMIN — SODIUM CHLORIDE: 9 INJECTION, SOLUTION INTRAVENOUS at 04:16

## 2020-07-18 RX ADMIN — CEFEPIME HYDROCHLORIDE 2 G: 2 INJECTION, POWDER, FOR SOLUTION INTRAVENOUS at 16:20

## 2020-07-18 RX ADMIN — VANCOMYCIN HYDROCHLORIDE 1750 MG: 10 INJECTION, POWDER, LYOPHILIZED, FOR SOLUTION INTRAVENOUS at 09:45

## 2020-07-18 RX ADMIN — ENOXAPARIN SODIUM 40 MG: 40 INJECTION SUBCUTANEOUS at 07:31

## 2020-07-18 RX ADMIN — CEFEPIME HYDROCHLORIDE 2 G: 2 INJECTION, POWDER, FOR SOLUTION INTRAVENOUS at 07:31

## 2020-07-18 RX ADMIN — CEFEPIME HYDROCHLORIDE 2 G: 2 INJECTION, POWDER, FOR SOLUTION INTRAVENOUS at 23:42

## 2020-07-18 RX ADMIN — METRONIDAZOLE 500 MG: 500 INJECTION, SOLUTION INTRAVENOUS at 08:09

## 2020-07-18 RX ADMIN — METRONIDAZOLE 500 MG: 500 INJECTION, SOLUTION INTRAVENOUS at 01:28

## 2020-07-18 RX ADMIN — METRONIDAZOLE 500 MG: 500 INJECTION, SOLUTION INTRAVENOUS at 16:25

## 2020-07-18 ASSESSMENT — PAIN DESCRIPTION - LOCATION: LOCATION: ABDOMEN

## 2020-07-18 ASSESSMENT — PAIN SCALES - GENERAL
PAINLEVEL_OUTOF10: 0
PAINLEVEL_OUTOF10: 2
PAINLEVEL_OUTOF10: 0
PAINLEVEL_OUTOF10: 3
PAINLEVEL_OUTOF10: 3

## 2020-07-18 ASSESSMENT — PAIN DESCRIPTION - PAIN TYPE: TYPE: SURGICAL PAIN

## 2020-07-18 NOTE — PROGRESS NOTES
2111: Shift assessment complete. VSS. Alert and oriented x4. See flowsheets. IV antibiotics started per [facility]. The care plan and education has been reviewed and mutually agreed upon with the patient. Pt needs expressed, call light within reach, will continue to monitor.

## 2020-07-18 NOTE — PROGRESS NOTES
Patient called asking if we ever found his glasses. It was not passed on to me they were missing, charge nurse said the previous nurse called PACU, and they said he never came with glasses. Belongings checked, glasses were found in pocket of his shorts he wore into hospital.  Patient was relieved and appreciative, no other needs voiced.

## 2020-07-18 NOTE — PLAN OF CARE
Problem: Pain:  Goal: Pain level will decrease  Description: Pain level will decrease  Outcome: Ongoing  Note: Rates pain a level 2-3 this shift, declines pain med. Problem: Falls - Risk of:  Goal: Will remain free from falls  Description: Will remain free from falls  Outcome: Ongoing  Note: Remains free from falls this shift.

## 2020-07-18 NOTE — PROGRESS NOTES
Hospitalist Progress Note      PCP: No primary care provider on file. Date of Admission: 7/17/2020    Chief Complaint: abdomional abscess    Hospital Course: 62 y.o. male with no significant past medical history presents for evaluation of right lower quadrant pain and drainage. No identifiable risk factors. ID o case. S/p OR with GS 7/17/20 for I&D     Subjective: Patient seen and examined. No acute c/o. Medications:  Reviewed    Infusion Medications   Scheduled Medications    sodium chloride flush  10 mL Intravenous 2 times per day    enoxaparin  40 mg Subcutaneous Daily    cefepime  2 g Intravenous Q8H    vancomycin  1,750 mg Intravenous Q12H    metroNIDAZOLE  500 mg Intravenous Q8H     PRN Meds: morphine **OR** [DISCONTINUED] morphine, sodium chloride flush, acetaminophen **OR** acetaminophen, oxyCODONE-acetaminophen      Intake/Output Summary (Last 24 hours) at 7/18/2020 1447  Last data filed at 7/18/2020 1422  Gross per 24 hour   Intake 6253.4 ml   Output 1500 ml   Net 4753.4 ml       Physical Exam Performed:    /80   Pulse 91   Temp 98.2 °F (36.8 °C) (Oral)   Resp 16   Ht 5' 5\" (1.651 m)   Wt 213 lb (96.6 kg)   SpO2 95%   BMI 35.45 kg/m²     General appearance: No apparent distress, appears stated age and cooperative. HEENT: Pupils equal, round, and reactive to light. Conjunctivae/corneas clear. Neck: Supple, with full range of motion. No jugular venous distention. Trachea midline. Respiratory:  Normal respiratory effort. Clear to auscultation, bilaterally without Rales/Wheezes/Rhonchi. Cardiovascular: Regular rate and rhythm with normal S1/S2 without murmurs, rubs or gallops. Abdomen: Soft, non-tender, non-distended with normal bowel sounds. Musculoskeletal: No clubbing, cyanosis or edema bilaterally. Full range of motion without deformity. Skin: Skin color, texture, turgor normal.  No rashes or lesions.   Neurologic:  Neurovascularly intact without any focal sensory/motor deficits. Cranial nerves: II-XII intact, grossly non-focal.  Psychiatric: Alert and oriented, thought content appropriate, normal insight  Capillary Refill: Brisk,< 3 seconds   Peripheral Pulses: +2 palpable, equal bilaterally       Labs:   Recent Labs     07/17/20  0830 07/18/20  0937   WBC 7.6 8.5   HGB 15.2 14.1   HCT 45.4 42.1    283     Recent Labs     07/17/20  0830 07/18/20  0654   * 135*   K 4.3 4.6    103   CO2 25 24   BUN 8 9   CREATININE 0.7* 0.7*   CALCIUM 9.2 9.0     Recent Labs     07/17/20  0830   AST 30   ALT 27   BILITOT 0.8   ALKPHOS 52     Recent Labs     07/17/20  0830   INR 1.25*     No results for input(s): [place] in the last 72 hours. Urinalysis:      Lab Results   Component Value Date    NITRU Negative 07/17/2020    BLOODU Negative 07/17/2020    SPECGRAV 1.022 07/17/2020    GLUCOSEU Negative 07/17/2020       Radiology:  CT ABDOMEN PELVIS W IV CONTRAST Additional Contrast? None   Final Result   Large 6.5 cm right ventral pelvic subcutaneous abscess.                  Assessment/Plan:    Active Hospital Problems    Diagnosis    Abdominal wall abscess [L02.211]     Subcutaneous abdominal wall abscess  Surgery consulted for I&D  Initiated on broad-spectrum antibiotics  Cultures ordered  We will narrow antibiotic coverage based on cultures    DVT Prophylaxis: lovenox  Diet: DIET GENERAL;  Code Status: Full Code    Electronically signed by Iliana Fuller MD on 7/18/2020 at 2:47 PM

## 2020-07-18 NOTE — PROGRESS NOTES
[facility]        Progress Note    Patient Name: Pete Haynes  MRN: [id number]  YOB: 1960  Date of Evaluation: 2020    Chief Complaint: Wound      Subjective:  No acute events overnight  Pain controlled and better  Denies nausea or vomiting, tolerating general diet  No issues with wound since OR  Resting in bed at this time      Post-Operative Day #1    Vital Signs:  Patient Vitals for the past 24 hrs:   BP Temp Temp src Pulse Resp SpO2 Height Weight   20 0730 124/79 97.6 °F (36.4 °C) Oral 63 16 96 % -- --   20 0410 (!) 175/71 97.5 °F (36.4 °C) Oral 70 16 94 % -- --   20 0002 110/75 97.7 °F (36.5 °C) Oral 66 16 93 % -- --   20 2111 127/84 97.6 °F (36.4 °C) Oral 72 16 92 % -- --   20 1930 120/79 98.3 °F (36.8 °C) Oral 68 16 93 % -- --   20 1900 128/81 98 °F (36.7 °C) Oral 72 16 97 % -- --   20 1830 136/89 98.3 °F (36.8 °C) Oral 69 16 100 % -- --   20 1815 137/81 -- -- 72 12 99 % -- --   20 1800 132/82 97.8 °F (36.6 °C) Temporal 72 14 95 % -- --   20 1755 (!) 140/85 -- -- 72 14 95 % -- --   20 1750 135/82 -- -- 75 14 94 % -- --   20 1744 131/76 97.7 °F (36.5 °C) Temporal 77 14 93 % -- --   20 1358 124/80 98.6 °F (37 °C) Temporal 69 16 98 % 5' 5\" (1.651 m) 213 lb (96.6 kg)   20 1320 114/74 -- -- 65 18 96 % -- --   20 1230 116/72 -- -- -- -- -- -- --   20 1200 122/77 -- -- -- -- 97 % -- --      TEMPERATURE HISTORY 24H: Temp (24hrs), Av °F (36.7 °C), Min:96.6 °F (35.9 °C), Max:98.6 °F (37 °C)    BLOOD PRESSURE HISTORY: Systolic (51RDZ), EGO:571 , Min:101 , ROSALIND:246    Diastolic (87HEF), MWH:19, Min:62, Max:107      Intake/Output:  I/O last 3 completed shifts: In: 5941.4 [P.O.:1820;  I.V.:3071.4; IV Piggyback:1050]  Out: 775 [Urine:725; Blood:50]  I/O this shift:  In: -   Out: 225 [Urine:225]  Drain/tube Output:       Physical Exam:  General: awake, alert, found for requested labs within last 30 days. Urine culture  No results found for: LABURIN    Pathology:  No relevant pathology     Imaging:  I have personally reviewed the following films:    Ct Abdomen Pelvis W Iv Contrast Additional Contrast? None    Result Date: 7/17/2020  EXAMINATION: CT OF THE ABDOMEN AND PELVIS WITH CONTRAST 7/17/2020 9:43 am TECHNIQUE: CT of the abdomen and pelvis was performed with the administration of intravenous contrast. Multiplanar reformatted images are provided for review. Dose modulation, iterative reconstruction, and/or weight based adjustment of the mA/kV was utilized to reduce the radiation dose to as low as reasonably achievable. COMPARISON: None. HISTORY: ORDERING SYSTEM PROVIDED HISTORY: abscess - abd - midline - prev surgical scar TECHNOLOGIST PROVIDED HISTORY: Additional Contrast?->None Reason for exam:->abscess - abd - midline - prev surgical scar Reason for Exam: abscess - abd - midline - prev surgical scar Acuity: Unknown Type of Exam: Unknown FINDINGS: Lower Chest: There is no consolidation or effusion. Organs: The liver exhibits mild diffuse fatty infiltration. The remainder of the solid abdominal organs are unremarkable aside from a small benign 3.3 cm simple left renal cyst. GI/Bowel: There is no bowel dilatation, wall thickening or obstruction. Pelvis: There are small fat and colon containing bilateral indirect inguinal hernias without complicating feature. The bladder and prostate are unremarkable. Peritoneum/Retroperitoneum: There is no free air, free fluid or intraperitoneal inflammatory change. There is no adenopathy. Bones/Soft Tissues: There is a thick walled rim enhancing fluid collection within the right lower pelvic ventral subcutaneous tissues. This measures approximately 4 x 6.5 cm and lies 1 cm deep to the skin surface. There is moderate surrounding inflammatory change/induration. There is no fracture or aggressive osseous lesion.      Large 6.5 cm right ventral pelvic subcutaneous abscess. Scheduled Meds:   sodium chloride flush  10 mL Intravenous 2 times per day    enoxaparin  40 mg Subcutaneous Daily    cefepime  2 g Intravenous Q8H    vancomycin  1,750 mg Intravenous Q12H    metroNIDAZOLE  500 mg Intravenous Q8H     Continuous Infusions:  PRN Meds:.morphine **OR** [DISCONTINUED] morphine, sodium chloride flush, acetaminophen **OR** acetaminophen, oxyCODONE-acetaminophen      Assessment:  62 y.o. male admitted with   1. Abdominal wall abscess        Status-post drainage and debridement with excisional debridement of abdominal wall abscess, 8 cm x 5 cm in size, including skin, subcutaneous tissue and muscle on 7/17/2020 for necrotizing abdominal wall abscess       Plan:  1. Local wound care with wet-to-dry dressing changes BID  2. General diet as tolerated  3. Stop IV hydration; monitor and correct electrolytes  4. Antibiotics, will switch to PO at discharge  5. Activity as tolerated  6. PRN analgesics and antiemetics--minimizing narcotics as tolerated, transition to PO  7. DVT prophylaxis with Lovenox  8. Management of medical comorbid etiologies per primary team and consulting services  9. Disposition: Anticipate discharge home in the next 24-48 hours    EDUCATION:  Educated patient on plan of care and disease process--all questions answered. Plans discussed with patient and nursing. Reviewed and discussed with Dr. Elvis Jeong.       Signed:  FRANCISCA Francisco CNP  7/18/2020 11:39 AM     Surg Staff:   Pt seen and examined with NP   See full note above  Wound is widely open and clean  Improved cellulitis globally thru lower abdomen  Ready for DC home tomorrow with dressing care and po antibiotics  OR cx's pending    Jay Bolton

## 2020-07-18 NOTE — PROGRESS NOTES
7:42 AM  Morning assessment complete and am meds given. Patient rates pain 3/10. Says he passed some gas yesterday. He agrees to ambulate with me later this morning. The care plan and education has been reviewed and mutually agreed upon with the patient. 2:07 PM  Patient up ambulating the hallway independently and frequently. Tolerating it well.

## 2020-07-18 NOTE — PROGRESS NOTES
VSS, assessment as charted. Lower abdominal dressing CD&I, rates pain a level 2 @present. Vancomycin now infused, Cefepime hung, denies other needs @present.

## 2020-07-18 NOTE — PROGRESS NOTES
Sitting up in bed watching TV, pt is alert and oriented and denies pain at this time. Call light in reach, will continue to monitor.

## 2020-07-18 NOTE — PROGRESS NOTES
At handoff, Vancomycin still hanging, only partially infused and Cefepime overdue. Pump reprogrammed to infuse the rest, will have pharmacy reschedule Cefepime and Flagyl doses for am according to time hung.

## 2020-07-19 VITALS
WEIGHT: 213 LBS | SYSTOLIC BLOOD PRESSURE: 128 MMHG | BODY MASS INDEX: 35.49 KG/M2 | DIASTOLIC BLOOD PRESSURE: 76 MMHG | TEMPERATURE: 97.8 F | RESPIRATION RATE: 16 BRPM | HEART RATE: 56 BPM | OXYGEN SATURATION: 97 % | HEIGHT: 65 IN

## 2020-07-19 LAB
ANION GAP SERPL CALCULATED.3IONS-SCNC: 9 MMOL/L (ref 3–16)
BASOPHILS ABSOLUTE: 0 K/UL (ref 0–0.2)
BASOPHILS RELATIVE PERCENT: 0.7 %
BUN BLDV-MCNC: 9 MG/DL (ref 7–20)
CALCIUM SERPL-MCNC: 9.1 MG/DL (ref 8.3–10.6)
CHLORIDE BLD-SCNC: 106 MMOL/L (ref 99–110)
CO2: 25 MMOL/L (ref 21–32)
CREAT SERPL-MCNC: 0.7 MG/DL (ref 0.9–1.3)
EOSINOPHILS ABSOLUTE: 0.1 K/UL (ref 0–0.6)
EOSINOPHILS RELATIVE PERCENT: 1.8 %
GFR AFRICAN AMERICAN: >60
GFR NON-AFRICAN AMERICAN: >60
GLUCOSE BLD-MCNC: 106 MG/DL (ref 70–99)
GRAM STAIN RESULT: ABNORMAL
HCT VFR BLD CALC: 41.3 % (ref 40.5–52.5)
HEMOGLOBIN: 13.7 G/DL (ref 13.5–17.5)
LYMPHOCYTES ABSOLUTE: 1.6 K/UL (ref 1–5.1)
LYMPHOCYTES RELATIVE PERCENT: 27.4 %
MAGNESIUM: 2.5 MG/DL (ref 1.8–2.4)
MCH RBC QN AUTO: 29.7 PG (ref 26–34)
MCHC RBC AUTO-ENTMCNC: 33.2 G/DL (ref 31–36)
MCV RBC AUTO: 89.3 FL (ref 80–100)
MONOCYTES ABSOLUTE: 0.4 K/UL (ref 0–1.3)
MONOCYTES RELATIVE PERCENT: 6 %
NEUTROPHILS ABSOLUTE: 3.8 K/UL (ref 1.7–7.7)
NEUTROPHILS RELATIVE PERCENT: 64.1 %
ORGANISM: ABNORMAL
PDW BLD-RTO: 12.9 % (ref 12.4–15.4)
PHOSPHORUS: 2.2 MG/DL (ref 2.5–4.9)
PLATELET # BLD: 255 K/UL (ref 135–450)
PMV BLD AUTO: 8.7 FL (ref 5–10.5)
POTASSIUM SERPL-SCNC: 3.8 MMOL/L (ref 3.5–5.1)
RBC # BLD: 4.62 M/UL (ref 4.2–5.9)
SODIUM BLD-SCNC: 140 MMOL/L (ref 136–145)
VANCOMYCIN TROUGH: 10.9 UG/ML (ref 10–20)
WBC # BLD: 6 K/UL (ref 4–11)
WOUND/ABSCESS: ABNORMAL
WOUND/ABSCESS: ABNORMAL

## 2020-07-19 PROCEDURE — 80202 ASSAY OF VANCOMYCIN: CPT

## 2020-07-19 PROCEDURE — 6360000002 HC RX W HCPCS: Performed by: SURGERY

## 2020-07-19 PROCEDURE — 84100 ASSAY OF PHOSPHORUS: CPT

## 2020-07-19 PROCEDURE — 85025 COMPLETE CBC W/AUTO DIFF WBC: CPT

## 2020-07-19 PROCEDURE — 6360000002 HC RX W HCPCS: Performed by: INTERNAL MEDICINE

## 2020-07-19 PROCEDURE — 36415 COLL VENOUS BLD VENIPUNCTURE: CPT

## 2020-07-19 PROCEDURE — 80048 BASIC METABOLIC PNL TOTAL CA: CPT

## 2020-07-19 PROCEDURE — 6370000000 HC RX 637 (ALT 250 FOR IP): Performed by: SURGERY

## 2020-07-19 PROCEDURE — 2580000003 HC RX 258: Performed by: INTERNAL MEDICINE

## 2020-07-19 PROCEDURE — 83735 ASSAY OF MAGNESIUM: CPT

## 2020-07-19 PROCEDURE — 99254 IP/OBS CNSLTJ NEW/EST MOD 60: CPT | Performed by: INTERNAL MEDICINE

## 2020-07-19 PROCEDURE — APPNB30 APP NON BILLABLE TIME 0-30 MINS: Performed by: NURSE PRACTITIONER

## 2020-07-19 PROCEDURE — 2500000003 HC RX 250 WO HCPCS: Performed by: SURGERY

## 2020-07-19 PROCEDURE — 99231 SBSQ HOSP IP/OBS SF/LOW 25: CPT | Performed by: SURGERY

## 2020-07-19 PROCEDURE — APPSS15 APP SPLIT SHARED TIME 0-15 MINUTES: Performed by: NURSE PRACTITIONER

## 2020-07-19 PROCEDURE — 6370000000 HC RX 637 (ALT 250 FOR IP): Performed by: NURSE PRACTITIONER

## 2020-07-19 PROCEDURE — 2580000003 HC RX 258: Performed by: SURGERY

## 2020-07-19 RX ORDER — SULFAMETHOXAZOLE AND TRIMETHOPRIM 800; 160 MG/1; MG/1
1 TABLET ORAL DAILY
Qty: 10 TABLET | Refills: 0 | Status: SHIPPED | OUTPATIENT
Start: 2020-07-19 | End: 2020-07-19 | Stop reason: HOSPADM

## 2020-07-19 RX ORDER — GREEN TEA/HOODIA GORDONII 315-12.5MG
1 CAPSULE ORAL 2 TIMES DAILY
Qty: 60 TABLET | Refills: 0 | Status: SHIPPED | OUTPATIENT
Start: 2020-07-19 | End: 2020-08-18

## 2020-07-19 RX ORDER — AMOXICILLIN AND CLAVULANATE POTASSIUM 875; 125 MG/1; MG/1
1 TABLET, FILM COATED ORAL 2 TIMES DAILY
Qty: 42 TABLET | Refills: 0 | Status: SHIPPED | OUTPATIENT
Start: 2020-07-19 | End: 2020-08-09

## 2020-07-19 RX ORDER — GREEN TEA/HOODIA GORDONII 315-12.5MG
1 CAPSULE ORAL 2 TIMES DAILY
Qty: 24 TABLET | Refills: 0 | Status: SHIPPED | OUTPATIENT
Start: 2020-07-19 | End: 2020-07-19 | Stop reason: HOSPADM

## 2020-07-19 RX ADMIN — CEFEPIME HYDROCHLORIDE 2 G: 2 INJECTION, POWDER, FOR SOLUTION INTRAVENOUS at 07:50

## 2020-07-19 RX ADMIN — METRONIDAZOLE 500 MG: 500 INJECTION, SOLUTION INTRAVENOUS at 07:50

## 2020-07-19 RX ADMIN — SODIUM CHLORIDE 3 G: 900 INJECTION INTRAVENOUS at 14:10

## 2020-07-19 RX ADMIN — POTASSIUM & SODIUM PHOSPHATES POWDER PACK 280-160-250 MG 500 MG: 280-160-250 PACK at 10:32

## 2020-07-19 RX ADMIN — ACETAMINOPHEN 650 MG: 325 TABLET, FILM COATED ORAL at 07:59

## 2020-07-19 RX ADMIN — METRONIDAZOLE 500 MG: 500 INJECTION, SOLUTION INTRAVENOUS at 01:00

## 2020-07-19 RX ADMIN — ENOXAPARIN SODIUM 40 MG: 40 INJECTION SUBCUTANEOUS at 07:49

## 2020-07-19 RX ADMIN — ACETAMINOPHEN 650 MG: 325 TABLET, FILM COATED ORAL at 17:37

## 2020-07-19 ASSESSMENT — ENCOUNTER SYMPTOMS
CONSTIPATION: 0
DIARRHEA: 0
EYE DISCHARGE: 0
BACK PAIN: 0
NAUSEA: 0
TROUBLE SWALLOWING: 0
SORE THROAT: 0
EYE REDNESS: 0
WHEEZING: 0
ABDOMINAL PAIN: 0
SHORTNESS OF BREATH: 0
COUGH: 0
RHINORRHEA: 0

## 2020-07-19 ASSESSMENT — PAIN SCALES - GENERAL
PAINLEVEL_OUTOF10: 4
PAINLEVEL_OUTOF10: 2
PAINLEVEL_OUTOF10: 4
PAINLEVEL_OUTOF10: 0
PAINLEVEL_OUTOF10: 4

## 2020-07-19 ASSESSMENT — PAIN DESCRIPTION - PAIN TYPE
TYPE: SURGICAL PAIN

## 2020-07-19 ASSESSMENT — PAIN DESCRIPTION - LOCATION
LOCATION: ABDOMEN

## 2020-07-19 NOTE — PLAN OF CARE
Problem: Pain:  Goal: Pain level will decrease  Description: Pain level will decrease  7/18/2020 2020 by Kamila Mercado RN  Outcome: Ongoing  7/18/2020 0703 by Alicia Roman RN  Outcome: Ongoing  Note: Rates pain a level 2-3 this shift, declines pain med. Goal: Control of acute pain  Description: Control of acute pain  Outcome: Ongoing  Goal: Control of chronic pain  Description: Control of chronic pain  Outcome: Ongoing     Problem: Falls - Risk of:  Goal: Will remain free from falls  Description: Will remain free from falls  7/18/2020 2020 by Kamila Mercado RN  Outcome: Ongoing  7/18/2020 0703 by Alicia Roman RN  Outcome: Ongoing  Note: Remains free from falls this shift.   Goal: Absence of physical injury  Description: Absence of physical injury  Outcome: Ongoing

## 2020-07-19 NOTE — DISCHARGE INSTR - COC
Continuity of Care Form    Patient Name: Azeem Geiger   :  1960  MRN:  [id number]    Admit date:  2020  Discharge date:  ***    Code Status Order: Full Code   Advance Directives:   [facility] Documentation     Date/Time Healthcare Directive Type of Healthcare Directive Copy in [address] [place] [address] [address] Agent's Name Healthcare Agent's Phone Number    [phone]  No, patient does not have an advance directive for healthcare treatment -- -- -- -- --          Admitting Physician:  Kris Farias MD  PCP: No primary care provider on file. Discharging Nurse: [facility] Unit/Room#: [id number]/1068-60  Discharging Unit Phone Number: ***    Emergency Contact:   Extended Emergency Contact Information  Primary Emergency Contact: Quan Bustos Phone: [phone]  Relation: Other  Secondary Emergency Contact: Susuaubree Gordon Phone: [phone]  Relation: Spouse  Preferred language: Ukrainian   needed? No    Past Surgical History:  Past Surgical History:   Procedure Laterality Date    OTHER SURGICAL HISTORY      ORIF RIGHT FOREARM       Immunization History: There is no immunization history on file for this patient.     Active Problems:  Patient Active Problem List   Diagnosis Code    Abdominal wall abscess L02.211       Isolation/Infection:   Isolation          No Isolation        Patient Infection Status     None to display          Nurse Assessment:  Last Vital Signs: /84   Pulse 55   Temp 98.2 °F (36.8 °C) (Oral)   Resp 16   Ht 5' 5\" (1.651 m)   Wt 213 lb (96.6 kg)   SpO2 99%   BMI 35.45 kg/m²     Last documented pain score (0-10 scale): Pain Level: 4  Last Weight:   Wt Readings from Last 1 Encounters:   20 213 lb (96.6 kg)     Mental Status:  {IP PT MENTAL STATUS:16357}    IV Access:  {[facility] IV ACCESS:386585876}    Nursing Mobility/ADLs:  Walking   {[facility] DME LGBU:455481901}  Transfer  {[facility] DME ELENA:271766243}  Bathing  {[facility] DME YTSC:505740479}  Dressing  {[facility] DME UZMT:124147640}  Toileting  {[facility] DME PMWL:064721292}  Feeding  {[facility] DME EELW:353203482}  Med Admin  {[facility] DME YZSI:754151839}  Med Delivery   { ELENA MED Delivery:192140323}    Wound Care Documentation and Therapy:        Elimination:  Continence:   · Bowel: {YES / CO:73474}  · Bladder: {YES / XS:06083}  Urinary Catheter: {Urinary Catheter:614450826}   Colostomy/Ileostomy/Ileal Conduit: {YES / UY:19685}       Date of Last BM: ***    Intake/Output Summary (Last 24 hours) at 2020 1114  Last data filed at 2020 0803  Gross per 24 hour   Intake 3492 ml   Output 2025 ml   Net 1467 ml     I/O last 3 completed shifts: In: 6079 [P.O.:1010;  I.V.:2482]  Out: 200 [Urine:1900]    Safety Concerns:     [employer] Safety Concerns:473922701}    Impairments/Disabilities:      [employer] Impairments/Disabilities:538012971}    Nutrition Therapy:  Current Nutrition Therapy:   [employer] Diet List:135587761}    Routes of Feeding: {[facility] DME Other Feedings:329694758}  Liquids: {Slp liquid thickness:04870}  Daily Fluid Restriction: {[facility] DME Yes amt example:159392858}  Last Modified Barium Swallow with Video (Video Swallowing Test): {Done Not Done JLLN:404828380}    Treatments at the Time of Hospital Discharge:   Respiratory Treatments: ***  Oxygen Therapy:  {Therapy; copd oxygen:64254}  Ventilator:    { CC Vent QMNY:736528035}    Rehab Therapies: {THERAPEUTIC INTERVENTION:4398344442}  Weight Bearing Status/Restrictions: [employer] Weight Bearin}  Other Medical Equipment (for information only, NOT a DME order):  {EQUIPMENT:415296121}  Other Treatments: ***    Patient's personal belongings (please select all that are sent with patient):  {P DME Belongings:486284993}    RN SIGNATURE:  {Esignature:529234823}    CASE MANAGEMENT/SOCIAL WORK SECTION    Inpatient Status Date: ***    Readmission Risk Assessment Score:  Readmission Risk              Risk of Unplanned

## 2020-07-19 NOTE — PROGRESS NOTES
[facility]        Progress Note    Patient Name: Cindy Lerner  MRN: [id number]  YOB: 1960  Date of Evaluation: 2020    Chief Complaint: Wound      Subjective:  No acute events overnight  Pain controlled and better, only requiring Tylenol  Denies nausea or vomiting, tolerating general diet  No issues with wound, changed by nursing last night  Ambulates well      Post-Operative Day #2    Vital Signs:  Patient Vitals for the past 24 hrs:   BP Temp Temp src Pulse Resp SpO2   20 0746 131/84 98.2 °F (36.8 °C) Oral 55 16 99 %   20 0430 119/77 97.6 °F (36.4 °C) Oral 59 16 97 %   20 2353 128/78 98.4 °F (36.9 °C) Oral 60 17 --   20 1953 137/72 98.6 °F (37 °C) Oral 64 16 96 %   20 1615 121/71 97.7 °F (36.5 °C) Oral 68 16 98 %   20 1226 124/80 98.2 °F (36.8 °C) Oral 91 16 95 %      TEMPERATURE HISTORY 24H: Temp (24hrs), Av.1 °F (36.7 °C), Min:97.6 °F (36.4 °C), Max:98.6 °F (37 °C)    BLOOD PRESSURE HISTORY: Systolic (69FSZ), ZCV:329 , Min:110 , UDM:054    Diastolic (09EOU), QGR:75, Min:71, Max:84      Intake/Output:  I/O last 3 completed shifts: In: 1152 [P.O.:1010;  I.V.:2482]  Out: 1900 [Urine:1900]  I/O this shift:  In: -   Out: 350 [Urine:350]  Drain/tube Output:       Physical Exam:  General: awake, alert, oriented to  person, place, time  Lungs: unlabored respirations  Abdomen: soft, non-distended, incisional/wound tenderness only  Skin/Wound: wound bed is clean without drainage, appropriately tender, cellulitis improving--dressing changed    Labs:  CBC:    Recent Labs     20  0830 20  0937 20  0754   WBC 7.6 8.5 6.0   HGB 15.2 14.1 13.7   HCT 45.4 42.1 41.3    283 255     BMP:    Recent Labs     20  0830 20  0654 20  0754   * 135* 140   K 4.3 4.6 3.8    103 106   CO2 25 24 25   BUN 8 9 9   CREATININE 0.7* 0.7* 0.7*   GLUCOSE 98 166* 106*     Hepatic:    Recent Labs     07/17/20  0830   AST 30   ALT 27   BILITOT 0.8   ALKPHOS 52     Amylase:  No results found for: AMYLASE  Lipase:    Lab Results   Component Value Date    LIPASE 29.0 07/17/2020      Mag:    Lab Results   Component Value Date    MG 2.50 07/19/2020     Phos:     Lab Results   Component Value Date    PHOS 2.2 07/19/2020      Coags:   Lab Results   Component Value Date    PROTIME 14.5 07/17/2020    INR 1.25 07/17/2020       Cultures:  Anaerobic culture  Lab Results   Component Value Date    LABANAE Anaerobic culture further report to follow 07/17/2020     Fungus stain  Results in Past 30 Days  Result Component Current Result Ref Range Previous Result Ref Range   Fungus Stain No Fungal elements seen (7/17/2020)  Not in Time Range      Gram stain  Results in Past 30 Days  Result Component Current Result Ref Range Previous Result Ref Range   Gram Stain Result 3+ Gram positive cocci  1+ WBC's (Polymorphonuclear)   (A) (7/17/2020)  Not in Time Range      Organism  Lab Results   Component Value Date/Time    ORG Staphylococcus aureus (A) 07/17/2020 05:17 PM     Surgical culture  Lab Results   Component Value Date/Time    CXSURG (A) 07/17/2020 05:17 PM     Mixed skin erin,  Rare growth  No further workup      CXSURG  07/17/2020 05:17 PM     Moderate growth  No further workup  Refer to culture [id number] for sensitivity results       Blood culture 1  Results in Past 30 Days  Result Component Current Result Ref Range Previous Result Ref Range   Blood Culture, Routine No Growth to date. Any change in status will be called. (7/17/2020)  Not in Time Range      Blood culture 2  Results in Past 30 Days  Result Component Current Result Ref Range Previous Result Ref Range   Culture, Blood 2 No Growth to date. Any change in status will be called. (7/17/2020)  Not in Time Range      Fecal occult  No results found for requested labs within last 30 days.      GI bacterial pathogens by PCR  No results found for requested labs within last 30 days. C. difficile  No results found for requested labs within last 30 days. Urine culture  No results found for: LABURIN    Pathology:  No relevant pathology     Imaging:  I have personally reviewed the following films:    Ct Abdomen Pelvis W Iv Contrast Additional Contrast? None    Result Date: 7/17/2020  EXAMINATION: CT OF THE ABDOMEN AND PELVIS WITH CONTRAST 7/17/2020 9:43 am TECHNIQUE: CT of the abdomen and pelvis was performed with the administration of intravenous contrast. Multiplanar reformatted images are provided for review. Dose modulation, iterative reconstruction, and/or weight based adjustment of the mA/kV was utilized to reduce the radiation dose to as low as reasonably achievable. COMPARISON: None. HISTORY: ORDERING SYSTEM PROVIDED HISTORY: abscess - abd - midline - prev surgical scar TECHNOLOGIST PROVIDED HISTORY: Additional Contrast?->None Reason for exam:->abscess - abd - midline - prev surgical scar Reason for Exam: abscess - abd - midline - prev surgical scar Acuity: Unknown Type of Exam: Unknown FINDINGS: Lower Chest: There is no consolidation or effusion. Organs: The liver exhibits mild diffuse fatty infiltration. The remainder of the solid abdominal organs are unremarkable aside from a small benign 3.3 cm simple left renal cyst. GI/Bowel: There is no bowel dilatation, wall thickening or obstruction. Pelvis: There are small fat and colon containing bilateral indirect inguinal hernias without complicating feature. The bladder and prostate are unremarkable. Peritoneum/Retroperitoneum: There is no free air, free fluid or intraperitoneal inflammatory change. There is no adenopathy. Bones/Soft Tissues: There is a thick walled rim enhancing fluid collection within the right lower pelvic ventral subcutaneous tissues. This measures approximately 4 x 6.5 cm and lies 1 cm deep to the skin surface. There is moderate surrounding inflammatory change/induration.   There is no fracture or aggressive osseous lesion. Large 6.5 cm right ventral pelvic subcutaneous abscess. Scheduled Meds:   sodium chloride flush  10 mL Intravenous 2 times per day    enoxaparin  40 mg Subcutaneous Daily    cefepime  2 g Intravenous Q8H    vancomycin  1,750 mg Intravenous Q12H    metroNIDAZOLE  500 mg Intravenous Q8H     Continuous Infusions:  PRN Meds:.morphine **OR** [DISCONTINUED] morphine, sodium chloride flush, acetaminophen **OR** acetaminophen, oxyCODONE-acetaminophen      Assessment:  62 y.o. male admitted with   1. Abdominal wall abscess        Status-post drainage and debridement with excisional debridement of abdominal wall abscess, 8 cm x 5 cm in size, including skin, subcutaneous tissue and muscle on 7/17/2020 for necrotizing abdominal wall abscess       Plan:  1. Local wound care with wet-to-dry dressing changes BID  2. General diet as tolerated  3. Monitor and correct electrolytes  4. Antibiotics, will switch to PO at discharge, Bactrim for 10 days--surgical cultures reveal Staph  5. Activity as tolerated  6. PRN analgesics and antiemetics--minimizing narcotics as tolerated, transition to PO  7. DVT prophylaxis with Lovenox  8. Management of medical comorbid etiologies per primary team and consulting services  9. Disposition: Okay for discharge home; follow up with Dr. Tia Joseph in 1 week    EDUCATION:  Educated patient on plan of care and disease process--all questions answered. Plans discussed with patient and nursing. Reviewed and discussed with Dr. Tia Joseph.       Signed:  FRANCISCA Leon - CNP  7/19/2020 11:11 AM     Surg Staff:  Pt seen and examined with NP  See full note above  Doing well  Pt has had Staph on cx  Wound clean  Cellulitis nearly resolved  OK for DC home, irrigate and clean wound daily in the shower  Pack with wet gauze, DSD overlying daily  PO Bactrim at discharge - will ck Sens and make sure all OK as outpt  See me in office in a couple ros [place]

## 2020-07-19 NOTE — CONSULTS
Infectious Diseases   Consult Note        Admission Date: 7/17/2020  Hospital Day: Hospital Day: 3   Attending: Sushil Early MD  Date of service: 7/19/20     Reason for admission: Abscess of abdominal wall [L02.211]  Abscess of abdominal wall [L02.211]    Chief complaint/ Reason for consult: MSSA abdominal wall abscess    Microbiology:        I have reviewed allavailable micro lab data and cultures    · Blood culture (2/2) - collected on 7/17/2020: Negative so far  · Abdominal wall abscess culture  - collected on 7/17/2020: MSSA    Staphylococcus aureus (1)     Antibiotic  Interpretation  CRYSTAL  Status     ceFAZolin  Sensitive  <=4  mcg/mL      clindamycin  Sensitive  <=0.5  mcg/mL      erythromycin  Sensitive  <=0.5  mcg/mL      oxacillin  Sensitive  0.5  mcg/mL      tetracycline  Sensitive  <=4  mcg/mL      trimethoprim-sulfamethoxazole  Sensitive  <=0.5/9.5  mcg/mL          Antibiotics and immunizations:       Current antibiotics: All antibiotics and their doses were reviewed by me    Recent Abx Admin                   metronidazole (FLAGYL) 500 mg in NaCl 100 mL IVPB premix (mg) 500 mg New Bag 07/19/20 0750     500 mg New Bag  0100     500 mg New Bag 07/18/20 1625    cefepime (MAXIPIME) 2 g IVPB minibag (g) 2 g New Bag 07/19/20 0750     2 g New Bag 07/18/20 2342     2 g New Bag  1620    vancomycin (VANCOCIN) 1,750 mg in dextrose 5 % 500 mL IVPB (mg) 1,750 mg New Bag 07/18/20 1954                  Immunization History: All immunization history was reviewed by me today. There is no immunization history on file for this patient. Known drug allergies: All allergies were reviewed and updated    No Known Allergies    Social history:     Social History:  All social andepidemiologic history was reviewed and updated by me today as needed. · Tobacco use:   reports that he has never smoked. He has never used smokeless tobacco.  · Alcohol use:   reports no history of alcohol use.   · Currently lives in: [address] [place] [address]  ·  reports no history of drug use. Assessment:     The patient is a 62 y.o. old male who  has no past medical history on file. with following problems:    · Deep abdominal wall abscess measuring 8 cm x 5 cm status post surgical I&D on 7/17/2020, surgical culture positive for MSSA  · Non-smoker  · Obesity Class 2 due to excess calorie intake : Body mass index is 35.45 kg/m². Discussion:      The patient presented with purulent drainage from the abdominal wall with wound culture growing MSSA. The CT scan showed a subcutaneous abscess and patient required surgical I&D on 7/17/2020. Interestingly, he was not febrile, neither had leukocytosis on admission. He is currently on IV vancomycin, cefepime and Flagyl      Plan:     Diagnostic Workup:    · Continue to follow fever curve, WBC count and blood cultures  · Follow up on liverand renal functions closely    Antimicrobials:    · Will stop IV vancomycin today  · We will stop IV cefepime and Flagyl today  · Order IV Unasyn 3 g every 6 hours  · Start oral probiotics twice daily  · Continue to monitor his vitals closely  · Can switch his antibiotic to oral Augmentin 1 g every 12 hour for 3 weeks at discharge  · He was advised to take probiotics twice daily  · Continue to watch for diarrhea while on antibiotics  · The patient was advised to call my office if he starts having any diarrhea or fever while on Augmentin  · Surgical site care  · DVT prophylaxis  · Discussed the above plan with patient and RN       Drug Monitoring:    · Continue serial monitoring for antibiotic toxicity as follows: CBC, CMP  · Continue to watch for following: new or worsening fever, hypotension, hives, lip swelling and redness or purulence at vascular access sites. I/v access Management:    · Continue to monitor i.v access sites for erythema, induration, discharge or tenderness.    · As always, continue efforts to minimizetubes/lines/drains as clinically appropriate to reduce chances of line associated infections. Current isolation precautions: There are no current isolations documented for this patient. Level of complexity of consult: High     Weight loss counseling:    Extensive weight loss counseling was done. It is important to set a realistic weight loss goal. First goal should be to avoid gaining more weight and staying at current weight (or within 5 percent). People at high risk of developing diabetes who are able to lose 5 percent of their body weight and maintain this weight will reduce their risk of developing diabetes by about 50 percent and reduce their blood pressure. Losing more than 15 percent of  body weight and staying at this weight is an extremely good result, even if you never reach your \"dream\" or \"ideal\" weight. Lifestyle changes including changing eating habits, substituting excess carbohydrates with proteins, stress reduction, using self-help programs like Weight Watchers®, Overeaters Anonymous®, and Take Off Pounds Sensibly (TOPS)© , following DASH diet and increasing exercise or walking briskly daily for half hour to and hour 5-7 days a week was suggested among other measures. Information was given about various weight loss education programs and their websites like www.cdc.gov/healthyweight, www.choosemyplate.gov and www.health.gov/dietaryguidelines/      Thank you for involving me in the care of your patient. I will continue to follow. If you have any additional questions, please do not hesitate to contact me. Subjective:     Presenting complaint in ER:     Chief Complaint   Patient presents with    Abdominal Pain     Pt to Er with c/o RLQ pain that is moving into left side for two weeeks, states diarrhea since but none today. nausea, no vomiting.  \"swelling inside\"        HPI: Kadeem Molina is a 62 y.o. male patient, who was seen at the request of Dr. Sherrill Garcia MD.    History was obtained from chart review and the patient. The patient was admitted on 7/17/2020. I have been consulted to see the patient for above mentioned reason(s). The patient has multiple medical comorbidities, and presented to the ER originally for abdominal pain. The patient was having right lower quadrant abdominal pain for around 2 weeks. The pain was increasing and patient also started having some purulent drainage from a cholecystectomy wound site. He got concerned and came to the ER. He was also having nausea and diarrhea reportedly. He was afebrile in the ER. He had a CT scan of abdomen and pelvis with IV contrast done which showed 6.7 cm diameter right ventral pelvic subcutaneous abscess. The patient was started empirically on IV vancomycin, cefepime and Flagyl. He was found to have a necrotizing abdominal wall abscess and required surgical debridement in the OR on 7/17/2020. Surgical culture is growing MSSA    I have been asked for my opinion for management for this patient. Past Medical History: All past medical history reviewed today. History reviewed. No pertinent past medical history. Past Surgical History: All pastsurgical history was reviewed today. Past Surgical History:   Procedure Laterality Date    OTHER SURGICAL HISTORY      ORIF RIGHT FOREARM         Family History: All family history was reviewed today. History reviewed. No pertinent family history. Medications: All current and past medications were reviewed. No medications prior to admission.  ampicillin-sulbactam  3 g Intravenous Q6H    sodium chloride flush  10 mL Intravenous 2 times per day    enoxaparin  40 mg Subcutaneous Daily          REVIEW OF SYSTEMS:       Review of Systems   Constitutional: Negative for chills, diaphoresis and fever. HENT: Negative for ear discharge, ear pain, rhinorrhea, sore throat and trouble swallowing. Eyes: Negative for discharge and redness. Respiratory: Negative for cough, shortness of breath and wheezing. Cardiovascular: Negative for chest pain and leg swelling. Gastrointestinal: Negative for abdominal pain, constipation, diarrhea and nausea. Endocrine: Negative for polyuria. Genitourinary: Negative for dysuria, flank pain, frequency, hematuria and urgency. Musculoskeletal: Negative for back pain and myalgias. Postop pain at the abdominal surgical site. Skin: Negative for rash. Neurological: Negative for dizziness, seizures and headaches. Hematological: Does not bruise/bleed easily. Psychiatric/Behavioral: Negative for hallucinations and suicidal ideas. All other systems reviewed and are negative. Objective:       PHYSICAL EXAM:      Vitals:   Vitals:    07/18/20 2353 07/19/20 0430 07/19/20 0746 07/19/20 1222   BP: 128/78 119/77 131/84 128/76   Pulse: 60 59 55 56   Resp: 17 16 16 16   Temp: 98.4 °F (36.9 °C) 97.6 °F (36.4 °C) 98.2 °F (36.8 °C) 97.8 °F (36.6 °C)   TempSrc: Oral Oral Oral Oral   SpO2:  97% 99% 97%   Weight:       Height:           Physical Exam  Vitals signs and nursing note reviewed. Constitutional:       Appearance: Normal appearance. He is well-developed. HENT:      Head: Normocephalic and atraumatic. Right Ear: External ear normal.      Left Ear: External ear normal.      Nose: Nose normal. No congestion or rhinorrhea. Mouth/Throat:      Mouth: Mucous membranes are moist.      Pharynx: No oropharyngeal exudate or posterior oropharyngeal erythema. Eyes:      General: No scleral icterus. Right eye: No discharge. Left eye: No discharge. Conjunctiva/sclera: Conjunctivae normal.      Pupils: Pupils are equal, round, and reactive to light. Neck:      Musculoskeletal: Normal range of motion and neck supple. No neck rigidity or muscular tenderness. Cardiovascular:      Rate and Rhythm: Normal rate and regular rhythm. Pulses: Normal pulses.       Heart sounds: No murmur. No friction rub. Pulmonary:      Effort: Pulmonary effort is normal. No respiratory distress. Breath sounds: Normal breath sounds. No stridor. No wheezing, rhonchi or rales. Abdominal:      General: Bowel sounds are normal.      Palpations: Abdomen is soft. Tenderness: There is no abdominal tenderness. There is no right CVA tenderness, left CVA tenderness, guarding or rebound. Comments: Transverse lower abdominal incision noted. Abdominal surgical site has a large open postop area with packing in place. No obvious purulence at the time of exam   Musculoskeletal: Normal range of motion. General: No swelling or tenderness. Lymphadenopathy:      Cervical: No cervical adenopathy. Skin:     General: Skin is warm and dry. Coloration: Skin is not jaundiced. Findings: No erythema or rash. Neurological:      General: No focal deficit present. Mental Status: He is alert and oriented to person, place, and time. Mental status is at baseline. Motor: No abnormal muscle tone. Psychiatric:         Mood and Affect: Mood normal.         Behavior: Behavior normal.         Thought Content: Thought content normal.           Lines: All vascular access sites are healthy with no local erythema, discharge or tenderness. Intake and output:     I/O last 3 completed shifts: In: 1690 [P.O.:1010; I.V.:2482]  Out: 1900 [Urine:1900]    Lab Data:   All available labs were reviewed by me today.      CBC:   Recent Labs     07/17/20  0830 07/18/20  0937 07/19/20  0754   WBC 7.6 8.5 6.0   RBC 5.04 4.72 4.62   HGB 15.2 14.1 13.7   HCT 45.4 42.1 41.3    283 255   MCV 89.9 89.2 89.3   MCH 30.0 29.9 29.7   MCHC 33.4 33.6 33.2   RDW 12.8 13.0 12.9        BMP:  Recent Labs     07/17/20  0830 07/18/20  0654 07/19/20  0754   * 135* 140   K 4.3 4.6 3.8    103 106   CO2 25 24 25   BUN 8 9 9   CREATININE 0.7* 0.7* 0.7*   CALCIUM 9.2 9.0 9.1   GLUCOSE 98 166* 106* Hepatic FunctionPanel:   Lab Results   Component Value Date    ALKPHOS 52 07/17/2020    ALT 27 07/17/2020    AST 30 07/17/2020    PROT 7.6 07/17/2020    BILITOT 0.8 07/17/2020    LABALBU 3.9 07/17/2020       CPK: No results found for: CKTOTAL  ESR: No results found for: SEDRATE  CRP: No results found for: CRP      Imaging: All pertinent images and reports for the current visit were reviewed by meduring this visit. CT ABDOMEN PELVIS W IV CONTRAST Additional Contrast? None   Final Result   Large 6.5 cm right ventral pelvic subcutaneous abscess. Outside records:    Labs, Microbiology, Radiology and pertinent results from Care everywhere, if available, were reviewed as a part ofthe consultation. Problem list:       Patient Active Problem List   Diagnosis Code    Abdominal wall abscess L02. 80    MSSA infection, non-invasive A49.01    Class 2 obesity due to excess calories with body mass index (BMI) of 35.0 to 35.9 in adult E66.09, Z68.35    Non-smoker Z78.9    Weight loss counseling, encounter for Z71.3         Please note that this chart was generated using Dragon dictation software. Although every effort was made to ensure the accuracy of this automated transcription, some errors in transcription may have occurred inadvertently. If you may need any clarification, please do not hesitate to contact me through EPIC or at the phone number provided below with my electronic signature. Any pictures or media included in this note were obtained after taking informed verbal consent from the patient and with their approval to include those in the patient's medical record.       Summer Luu MD, MPH  7/19/20, 12:58 PM EDT   [facility] Infectious Disease   Office: [phone]  Fax: [phone]  Tuesday AM clinic:   [address] [facility] [address] [facility] [address]  Thursday AM clinic: [address] [facility]

## 2020-07-19 NOTE — PROGRESS NOTES
[address] [facility] PROGRESS NOTE    7/19/2020 4:04 PM        Name: Kadeem Molina . Admitted: 7/17/2020  Primary Care Provider: No primary care provider on file. (Tel: None)      Subjective:  Patient is a 63 yo male with no significant PMH. He presented to hospital with RLQ pain and drainage over past few days. He also had diarrhea which has improved. Patient abdominal wall abscess, admitted for IV antibiotics and I&D. Presently resting in bed. States he feels good. He denies abdominal pain, has not required pain meds. Remains afebrile, no leukocytosis. Surgery has been in and okay for DC. Awaiting ID consult. Reviewed interval ancillary notes    Current Medications  ampicillin-sulbactam (UNASYN) 3 g ivpb minibag, Q6H  morphine (PF) injection 2 mg, Q4H PRN  sodium chloride flush 0.9 % injection 10 mL, 2 times per day  sodium chloride flush 0.9 % injection 10 mL, PRN  acetaminophen (TYLENOL) tablet 650 mg, Q6H PRN    Or  acetaminophen (TYLENOL) suppository 650 mg, Q6H PRN  enoxaparin (LOVENOX) injection 40 mg, Daily  oxyCODONE-acetaminophen (PERCOCET) 5-325 MG per tablet 1 tablet, Q6H PRN        Objective:  /76   Pulse 56   Temp 97.8 °F (36.6 °C) (Oral)   Resp 16   Ht 5' 5\" (1.651 m)   Wt 213 lb (96.6 kg)   SpO2 97%   BMI 35.45 kg/m²     Intake/Output Summary (Last 24 hours) at 7/19/2020 1604  Last data filed at 7/19/2020 1445  Gross per 24 hour   Intake 3292 ml   Output 1925 ml   Net 1367 ml      Wt Readings from Last 3 Encounters:   07/17/20 213 lb (96.6 kg)       General appearance:  Appears comfortable  Eyes: Sclera clear. Pupils equal.  ENT: Moist oral mucosa. Trachea midline, no adenopathy. Cardiovascular: Regular rhythm, normal S1, S2. No murmur. No edema in lower extremities  Respiratory: Not using accessory muscles. Good inspiratory effort.  Clear to auscultation bilaterally, no wheeze or crackles. GI: Abdomen soft, no tenderness, not distended, normal bowel sounds, dressing C/D/I to lower abdomen. Musculoskeletal: No cyanosis in digits, neck supple  Neurology: CN 2-12 grossly intact. No speech or motor deficits  Psych: Normal affect. Alert and oriented in time, place and person  Skin: Warm, dry, normal turgor    Labs and Tests:  CBC:   Recent Labs     07/17/20  0830 07/18/20  0937 07/19/20  0754   WBC 7.6 8.5 6.0   HGB 15.2 14.1 13.7    283 255     BMP:    Recent Labs     07/17/20  0830 07/18/20  0654 07/19/20  0754   * 135* 140   K 4.3 4.6 3.8    103 106   CO2 25 24 25   BUN 8 9 9   CREATININE 0.7* 0.7* 0.7*   GLUCOSE 98 166* 106*     Hepatic:   Recent Labs     07/17/20  0830   AST 30   ALT 27   BILITOT 0.8   ALKPHOS 52       CT Abdomen/Pelvis 7/17/2020:  FINDINGS:    Lower Chest: There is no consolidation or effusion.         Organs: The liver exhibits mild diffuse fatty infiltration.  The remainder of    the solid abdominal organs are unremarkable aside from a small benign 3.3 cm    simple left renal cyst.         GI/Bowel: There is no bowel dilatation, wall thickening or obstruction.         Pelvis: There are small fat and colon containing bilateral indirect inguinal    hernias without complicating feature.  The bladder and prostate are    unremarkable.         Peritoneum/Retroperitoneum: There is no free air, free fluid or    intraperitoneal inflammatory change.  There is no adenopathy.         Bones/Soft Tissues:  There is a thick walled rim enhancing fluid collection    within the right lower pelvic ventral subcutaneous tissues.  This measures    approximately 4 x 6.5 cm and lies 1 cm deep to the skin surface.  There is    moderate surrounding inflammatory change/induration. Jimbo Reina is no fracture or    aggressive osseous lesion.              Impression    Large 6.5 cm right ventral pelvic subcutaneous abscess.         Problem List  Active Problems:    Abdominal wall abscess    MSSA infection, non-invasive    Class 2 obesity due to excess calories with body mass index (BMI) of 35.0 to 35.9 in adult    Non-smoker    Weight loss counseling, encounter for  Resolved Problems:    * No resolved hospital problems. *       Assessment & Plan:   1. Subcutaneous abdominal wall abscess. Associated with old appendectomy scar. He has been started on cefepime, metronidazole, and vancomycin by surgery. Underwent drainage and debridement abdominal wall abscess 7/17. Culture shows staph. Surgery on board, danie for DC on Bactrim DS. ID consult pending.        Diet: DIET GENERAL;  Code:Full Code  DVT PPX: on enoxaparin      FRANCISCA Martinez CNP   7/19/2020 4:04 PM

## 2020-07-19 NOTE — DISCHARGE SUMMARY
[facility]    Patient Demographics    Patient. Hugo Romero  Date of Birth. 1960  MRN. [id number]     Primary care provider. No primary care provider on file. (Tel: None)    Admit date: 7/17/2020    Discharge date (blank if same as Note Date): Note Date: 7/19/2020     Reason for Hospitalization. Chief Complaint   Patient presents with    Abdominal Pain     Pt to Er with c/o RLQ pain that is moving into left side for two weeeks, states diarrhea since but none today. nausea, no vomiting. \"swelling inside\"         Significant Findings. Active Problems:    Abdominal wall abscess    MSSA infection, non-invasive    Class 2 obesity due to excess calories with body mass index (BMI) of 35.0 to 35.9 in adult    Non-smoker    Weight loss counseling, encounter for  Resolved Problems:    * No resolved hospital problems. *       Problems and results from this hospitalization that need follow up. 1. Abdominal wound. Follow up with surgery in 1 week. Significant test results and incidental findings. CT Abdomen/Pelvis 7/17/2020:  FINDINGS:    Lower Chest: There is no consolidation or effusion.         Organs: The liver exhibits mild diffuse fatty infiltration.  The remainder of    the solid abdominal organs are unremarkable aside from a small benign 3.3 cm    simple left renal cyst.         GI/Bowel: There is no bowel dilatation, wall thickening or obstruction.         Pelvis: There are small fat and colon containing bilateral indirect inguinal    hernias without complicating feature.  The bladder and prostate are    unremarkable.         Peritoneum/Retroperitoneum: There is no free air, free fluid or    intraperitoneal inflammatory change.  There is no adenopathy.         Bones/Soft Tissues:  There is a thick walled rim enhancing fluid collection    within the right lower pelvic ventral subcutaneous tissues.  This measures    approximately 4 x 6.5 cm and lies 1 cm deep to the skin surface.  There is    moderate surrounding inflammatory change/induration. Lorilee Aki is no fracture or    aggressive osseous lesion.              Impression    Large 6.5 cm right ventral pelvic subcutaneous abscess.          Invasive procedures and treatments. Status-post drainage and debridement with excisional debridement of abdominal wall abscess, 8 cm x 5 cm in size, including skin, subcutaneous tissue and muscle on 7/17/2020 for necrotizing abdominal wall abscess     [facility] Course. Patient is a 61 yo male with no significant PMH. He presented to hospital with RLQ pain and drainage over past few days. He also had diarrhea which resolved spontaneously. Found to have abdominal wall abscess associated with old appendectomy scar. CT scan revealed large right ventral pelvic subcutaneous abscess. He admitted for IV antibiotics and started on cefepime, metronidazole, and vancomycin. Patient underwent drainage and debridement of deep necrotizing abdominal wall abscess 7/17 by Dr Darron Viveros on 7/17. Surgical culture showed MSSA. Post operative course uneventful. Pain improved, only requiring Tylenol, cellulitis improving. He was seen by ID and was discharged home on Augmentin 1 gram q 12 hours for 3 weeks along with probiotics. He was instructed in wound care which involves daily cleansing and irrigation in shower and packing with wet gauze. He is to follow up with surgery in 1 week. Consults. IP CONSULT TO GENERAL SURGERY  IP CONSULT TO HOSPITALIST  IP CONSULT TO GENERAL SURGERY  IP CONSULT TO INFECTIOUS DISEASES  IP CONSULT TO HOME CARE NEEDS    Physical examination on discharge day. /76   Pulse 56   Temp 97.8 °F (36.6 °C) (Oral)   Resp 16   Ht 5' 5\" (1.651 m)   Wt 213 lb (96.6 kg)   SpO2 97%   BMI 35.45 kg/m²   General appearance. Alert. Looks comfortable. HEENT. Sclera clear. Moist mucus membranes. Cardiovascular. Regular rate and rhythm, normal S1, S2. No murmur. Respiratory. Not using accessory muscles. Clear to auscultation bilaterally, no wheeze. Gastrointestinal. Abdomen soft, non-tender, not distended, normal bowel sounds, dressing C/D/I right lower abdomen. Neurology. Facial symmetry. No speech deficits. Moving all extremities equally. Extremities. No edema in lower extremities. Skin. Warm, dry, normal turgor    Condition at time of discharge stable    Medication instructions provided to patient at discharge. Medication List      START taking these medications    amoxicillin-clavulanate 875-125 MG per tablet  Commonly known as:  AUGMENTIN  Take 1 tablet by mouth 2 times daily for 21 days  Notes to patient:  Take until completely finished. Probiotic Acidophilus Tabs  Take 1 tablet by mouth 2 times daily  Notes to patient:  Used to help prevent loose stool from antibiotic           Where to Get Your Medications      These medications were sent to [address] [place] [address], [address], [address] [facility]    Phone:  [phone]   · amoxicillin-clavulanate 875-125 MG per tablet  · Probiotic Acidophilus Tabs         Discharge recommendations given to patient. Follow Up. Surgery in 1 week, PCP in 2 weeks   Disposition. Home  Activity. As tolerated  Diet: DIET GENERAL;      Spent 40 minutes in discharge process.     Signed:  Marylene Blander, APRN - CNP     7/19/2020 6:00 PM

## 2020-07-19 NOTE — PROGRESS NOTES
Patient is okay to be discharged from infectious disease standpoint, once cleared by primary service and other sub-specialties. My final orders are in the chart. Feel free to call me with questions, if needed.       Beth Grant MD, MPH  7/19/2020, 4:59 PM  [facility] Infectious Disease   Office: [phone]  Fax: [phone]  Tuesday AM clinic:   [address] [address]  Thursday AM clinic: [address] [facility]

## 2020-07-19 NOTE — PROGRESS NOTES
Clinical Pharmacy Note: Pharmacy to Dose Vancomcyin    Vancomycin Day: 3  Current Dosin mg Q12      Recent Labs     20  0654 20  0754   BUN 9 9       Recent Labs     20  0654 20  0754   CREATININE 0.7* 0.7*       Recent Labs     20  0937 20  0754   WBC 8.5 6.0         Intake/Output Summary (Last 24 hours) at 2020 1259  Last data filed at 2020 0803  Gross per 24 hour   Intake 3492 ml   Output 2025 ml   Net 1467 ml         Ht Readings from Last 1 Encounters:   20 5' 5\" (1.651 m)        Wt Readings from Last 1 Encounters:   20 213 lb (96.6 kg)         Body mass index is 35.45 kg/m². Estimated Creatinine Clearance: 121 mL/min (A) (based on SCr of 0.7 mg/dL (L)). Trough/Random: 10.9    Assessment/Plan:  Vancomycin level is therapeutic. Level was drawn appropriately in respect to last dose given. Continue current dose of vancomycin. No new troughs scheduled as patient likely to DC on PO antibiotics soon. Changes in regimen will be determined based on culture results, renal function, and clinical response. Pharmacy will continue to monitor and adjust regimen as necessary.     Thank you for the consult,    Scott Gill, PharmD  Clinical Pharmacist [id number]  2020

## 2020-07-21 LAB
BLOOD CULTURE, ROUTINE: NORMAL
CULTURE, BLOOD 2: NORMAL

## 2020-07-23 LAB
ANAEROBIC CULTURE: ABNORMAL
CULTURE SURGICAL: ABNORMAL
CULTURE SURGICAL: ABNORMAL
ORGANISM: ABNORMAL

## 2020-07-28 ENCOUNTER — OFFICE VISIT (OUTPATIENT)
Dept: SURGERY | Age: 60
End: 2020-07-28

## 2020-07-28 VITALS
TEMPERATURE: 97.3 F | SYSTOLIC BLOOD PRESSURE: 140 MMHG | WEIGHT: 211 LBS | DIASTOLIC BLOOD PRESSURE: 88 MMHG | BODY MASS INDEX: 35.11 KG/M2

## 2020-07-28 PROCEDURE — 99213 OFFICE O/P EST LOW 20 MIN: CPT | Performed by: SURGERY

## 2020-07-28 NOTE — PROGRESS NOTES
[facility] GENERAL AND LAPAROSCOPIC SURGERY          PATIENT NAME: Severo Ast     TODAY'S DATE: 7/28/2020    SUBJECTIVE:  CC abd infection and pain  Pt with open wound, lower mid abdomen, doing daily dressing. Has had cellulitis, did po atbx post hospital stay at home. No F/C. Site improved - has some pain, mild in the area, focal, sharp with pressure.  No N/V.     OBJECTIVE:  VITALS:  BP (!) 140/88   Temp 97.3 °F (36.3 °C)   Wt 211 lb (95.7 kg)   BMI 35.11 kg/m²     CONSTITUTIONAL:  awake and alert  LUNGS:  clear to auscultation  ABDOMEN:  normal bowel sounds, soft, non-distended, tenderness noted in the lower mid abdomen, ellulitis resolved, wound with some fibrinous debris, overall improved     Data:    Radiology Review:  None    Staphylococcus aureus (1)     Antibiotic  Interpretation  CRYSTAL  Status    ceFAZolin  Sensitive  <=4  mcg/mL     clindamycin  Sensitive  <=0.5  mcg/mL     erythromycin  Sensitive  <=0.5  mcg/mL     oxacillin  Sensitive  0.5  mcg/mL     tetracycline  Sensitive  <=4  mcg/mL     trimethoprim-sulfamethoxazole  Sensitive  <=0.5/9.5  mcg/mL         ASSESSMENT AND PLAN:  Abd wall cellulitis, large abscess, open wound  Did well with po atbx  Needs more vigorous daily cleaning in shower  Daily wet to dry dressing  See back in 2 weeks    Fletcher Haines

## 2020-08-13 ENCOUNTER — OFFICE VISIT (OUTPATIENT)
Dept: SURGERY | Age: 60
End: 2020-08-13

## 2020-08-13 VITALS
BODY MASS INDEX: 35.78 KG/M2 | TEMPERATURE: 97.3 F | WEIGHT: 215 LBS | DIASTOLIC BLOOD PRESSURE: 76 MMHG | SYSTOLIC BLOOD PRESSURE: 118 MMHG

## 2020-08-13 PROCEDURE — 99212 OFFICE O/P EST SF 10 MIN: CPT | Performed by: SURGERY

## 2020-08-13 NOTE — PROGRESS NOTES
[facility] GENERAL AND LAPAROSCOPIC SURGERY          PATIENT NAME: Elias Mustafa     TODAY'S DATE: 8/13/2020    SUBJECTIVE:  CC abd infection  Pt with out pain, lower gauze on skin, feels more active, site smaller, no F/C. No drainage.      OBJECTIVE:  VITALS:  /76   Temp 97.3 °F (36.3 °C) (Infrared)   Wt 215 lb (97.5 kg)   BMI 35.78 kg/m²     CONSTITUTIONAL:  awake and alert  LUNGS:  clear to auscultation  ABDOMEN:  normal bowel sounds, soft, non-distended, non-tender, cellulitis resolved, open wound smaller, no fibrinous debris     Data:    Radiology Review:  None      ASSESSMENT AND PLAN:  Abd wall cellulitis - site improved  Use skin cream, change dressing, NS wet to dry, and clean daily  No atbx at this time    Mark Garvey

## 2020-08-17 LAB
FUNGUS (MYCOLOGY) CULTURE: NORMAL
FUNGUS STAIN: NORMAL

## 2020-09-10 ENCOUNTER — OFFICE VISIT (OUTPATIENT)
Dept: SURGERY | Age: 60
End: 2020-09-10

## 2020-09-10 VITALS
SYSTOLIC BLOOD PRESSURE: 124 MMHG | TEMPERATURE: 97 F | WEIGHT: 214 LBS | DIASTOLIC BLOOD PRESSURE: 79 MMHG | BODY MASS INDEX: 35.61 KG/M2

## 2020-09-10 PROCEDURE — 99024 POSTOP FOLLOW-UP VISIT: CPT | Performed by: SURGERY

## 2020-09-10 NOTE — PROGRESS NOTES
[facility] GENERAL AND LAPAROSCOPIC SURGERY          PATIENT NAME: Elvi Manzano     TODAY'S DATE: 9/10/2020    SUBJECTIVE:    Pt feeling well. No F/C. Wound smaller. OBJECTIVE:  VITALS:  Temp 97 °F (36.1 °C)   Wt 214 lb (97.1 kg)   BMI 35.61 kg/m²     CONSTITUTIONAL:  awake and alert  LUNGS:  clear to auscultation  ABDOMEN:  normal bowel sounds, soft, non-distended, non-tender, right lower incision / abscess site nearly closed, no cellulitis or drainage  BIH present, left larger - recurrent     Data:    Radiology Review:  None      ASSESSMENT AND PLAN:  Abd wall abscess - nearly resolved - do dressing til complete - 1 - 2 weeks  Recurrent BIH - repair towards year end - pt to call when ready.  TAP appears good approach    Jeff Goldman

## 2022-05-24 NOTE — ED NOTES
Reports to the ED rt abdominal abscess that has been present for two weeks. Wound is approximately one inch in diameter & draining blood tinged yellow pus. Was initially described as \"a blister\" by pt; popped this morning. Denies event causing injury to site or other complaints.        Chetna Brenner RN  07/17/20 1275 See a1c annotation.

## 2022-11-12 NOTE — CONSULTS
Clinical Pharmacy Note: Pharmacy to Dose Vancomycin    Pedro Madrigal is a 62 y.o. male started on Vancomycin for abdominal abscess; consult received from Dr. Mitra Nevarez to manage therapy. Also receiving the following antibiotics: cefepime/flagyl. Goal Trough Level: 15-20 mcg/mL    Assessment/Plan:  Initiate vancomycin 1750 mg IV every 12 hours. A vancomycin trough has been ordered for 9/19 @0900. Changes in regimen will be determined based on culture results, renal function, and clinical response. Pharmacy will continue to monitor and adjust regimen as necessary. Allergies:  Patient has no known allergies. Recent Labs     07/17/20  0830   CREATININE 0.7*       Recent Labs     07/17/20  0830   WBC 7.6       Ht Readings from Last 1 Encounters:   07/17/20 5' 5\" (1.651 m)        Wt Readings from Last 1 Encounters:   07/17/20 213 lb (96.6 kg)         Estimated Creatinine Clearance: 121 mL/min (A) (based on SCr of 0.7 mg/dL (L)).       Thank you for the consult,    Cristopher Mccray, PharmD, [id number].   [id number] Yes

## (undated) DEVICE — PAD,ABDOMINAL,8"X10",ST,LF: Brand: MEDLINE

## (undated) DEVICE — GAUZE,SPONGE,4"X4",8PLY,STRL,LF,10/TRAY: Brand: MEDLINE

## (undated) DEVICE — COVER LT HNDL BLU PLAS

## (undated) DEVICE — MERCY FAIRFIELD TURNOVER KIT: Brand: MEDLINE INDUSTRIES, INC.

## (undated) DEVICE — BLADE ES ELASTOMERIC COAT INSUL DURABLE BEND UPTO 90DEG

## (undated) DEVICE — PENCIL ES ULT VAC W TELSCP NOSE EZ CLN BLDE 10FT

## (undated) DEVICE — GLOVE SURG SZ 6.5 L11.2IN FNGR THK9.8MIL STRW LTX POLYMER

## (undated) DEVICE — SOLUTION IV IRRIG POUR BRL 0.9% SODIUM CHL 2F7124

## (undated) DEVICE — BANDAGE,GAUZE,BULKEE II,4.5"X4.1YD,STRL: Brand: MEDLINE

## (undated) DEVICE — MAJOR SET UP PK

## (undated) DEVICE — TRAY PREP DRY W/ PREM GLV 2 APPL 6 SPNG 2 UNDPD 1 OVERWRAP

## (undated) DEVICE — SHEET, T, LAPAROTOMY, STERILE: Brand: MEDLINE